# Patient Record
Sex: FEMALE | Race: OTHER | HISPANIC OR LATINO
[De-identification: names, ages, dates, MRNs, and addresses within clinical notes are randomized per-mention and may not be internally consistent; named-entity substitution may affect disease eponyms.]

---

## 2017-03-29 ENCOUNTER — APPOINTMENT (OUTPATIENT)
Dept: COLORECTAL SURGERY | Facility: CLINIC | Age: 42
End: 2017-03-29

## 2017-03-29 VITALS
HEART RATE: 71 BPM | WEIGHT: 142 LBS | BODY MASS INDEX: 28.63 KG/M2 | TEMPERATURE: 98.1 F | DIASTOLIC BLOOD PRESSURE: 67 MMHG | SYSTOLIC BLOOD PRESSURE: 102 MMHG | RESPIRATION RATE: 12 BRPM | HEIGHT: 59 IN

## 2017-05-06 ENCOUNTER — EMERGENCY (EMERGENCY)
Facility: HOSPITAL | Age: 42
LOS: 1 days | Discharge: HOME | End: 2017-05-06
Attending: EMERGENCY MEDICINE
Payer: COMMERCIAL

## 2017-05-06 VITALS
HEIGHT: 60 IN | DIASTOLIC BLOOD PRESSURE: 59 MMHG | TEMPERATURE: 98 F | SYSTOLIC BLOOD PRESSURE: 92 MMHG | OXYGEN SATURATION: 99 % | RESPIRATION RATE: 17 BRPM | WEIGHT: 147.93 LBS | HEART RATE: 86 BPM

## 2017-05-06 DIAGNOSIS — R10.9 UNSPECIFIED ABDOMINAL PAIN: ICD-10-CM

## 2017-05-06 DIAGNOSIS — Z98.51 TUBAL LIGATION STATUS: ICD-10-CM

## 2017-05-06 DIAGNOSIS — R10.30 LOWER ABDOMINAL PAIN, UNSPECIFIED: ICD-10-CM

## 2017-05-06 DIAGNOSIS — Z98.51 TUBAL LIGATION STATUS: Chronic | ICD-10-CM

## 2017-05-06 DIAGNOSIS — Z71.1 PERSON WITH FEARED HEALTH COMPLAINT IN WHOM NO DIAGNOSIS IS MADE: ICD-10-CM

## 2017-05-06 PROCEDURE — T1013: CPT

## 2017-05-06 PROCEDURE — 99283 EMERGENCY DEPT VISIT LOW MDM: CPT

## 2017-05-06 PROCEDURE — 99282 EMERGENCY DEPT VISIT SF MDM: CPT

## 2017-05-06 NOTE — ED STATDOCS - OBJECTIVE STATEMENT
This is a 41 year old female with hx of tubal ligation presenting to the ED c/o abdominal pain the began 2 months ago. Pt was called by PMD and advised to seek ED evaluation for abdominal US (?). She states she has surgery scheduled on 5/20 for hernia repair, but pt states she is unsure if she even has one. Pt has come to ED to confirm if she has hernia or not. Denies fever, chills, nausea/vomiting, CP, SOB, HA. No further complaints at this time.

## 2017-05-06 NOTE — ED ADULT TRIAGE NOTE - CHIEF COMPLAINT QUOTE
Pt c/o abdominal pain in old surgical site x3 years ago.  Pt denies fever, nausea or vomiting.  Pt states when she eats spicy foods it bothers her and was told by pmd she has a hernia.

## 2017-05-06 NOTE — ED STATDOCS - MEDICAL DECISION MAKING DETAILS
Pt concerned that she may have a hernia and came for a check up, no hernia palpated, pt instructed to f/u with PMD to get outpt studies as needed.

## 2017-05-12 ENCOUNTER — OUTPATIENT (OUTPATIENT)
Dept: OUTPATIENT SERVICES | Facility: HOSPITAL | Age: 42
LOS: 1 days | End: 2017-05-12
Payer: COMMERCIAL

## 2017-05-12 VITALS
SYSTOLIC BLOOD PRESSURE: 93 MMHG | RESPIRATION RATE: 16 BRPM | DIASTOLIC BLOOD PRESSURE: 63 MMHG | HEART RATE: 77 BPM | HEIGHT: 57 IN | TEMPERATURE: 98 F | WEIGHT: 157.19 LBS

## 2017-05-12 DIAGNOSIS — Z01.818 ENCOUNTER FOR OTHER PREPROCEDURAL EXAMINATION: ICD-10-CM

## 2017-05-12 DIAGNOSIS — K43.0 INCISIONAL HERNIA WITH OBSTRUCTION, WITHOUT GANGRENE: ICD-10-CM

## 2017-05-12 DIAGNOSIS — Z98.51 TUBAL LIGATION STATUS: Chronic | ICD-10-CM

## 2017-05-12 LAB
ANION GAP SERPL CALC-SCNC: 13 MMOL/L — SIGNIFICANT CHANGE UP (ref 5–17)
APTT BLD: 32.8 SEC — SIGNIFICANT CHANGE UP (ref 27.5–37.4)
BLD GP AB SCN SERPL QL: SIGNIFICANT CHANGE UP
BUN SERPL-MCNC: 19 MG/DL — SIGNIFICANT CHANGE UP (ref 8–20)
CALCIUM SERPL-MCNC: 9.6 MG/DL — SIGNIFICANT CHANGE UP (ref 8.6–10.2)
CHLORIDE SERPL-SCNC: 101 MMOL/L — SIGNIFICANT CHANGE UP (ref 98–107)
CO2 SERPL-SCNC: 25 MMOL/L — SIGNIFICANT CHANGE UP (ref 22–29)
CREAT SERPL-MCNC: 0.6 MG/DL — SIGNIFICANT CHANGE UP (ref 0.5–1.3)
GLUCOSE SERPL-MCNC: 103 MG/DL — SIGNIFICANT CHANGE UP (ref 70–115)
HCT VFR BLD CALC: 35.7 % — LOW (ref 37–47)
HGB BLD-MCNC: 11.9 G/DL — LOW (ref 12–16)
INR BLD: 1.07 RATIO — SIGNIFICANT CHANGE UP (ref 0.88–1.16)
MCHC RBC-ENTMCNC: 30.9 PG — SIGNIFICANT CHANGE UP (ref 27–31)
MCHC RBC-ENTMCNC: 33.3 G/DL — SIGNIFICANT CHANGE UP (ref 32–36)
MCV RBC AUTO: 92.7 FL — SIGNIFICANT CHANGE UP (ref 81–99)
PLATELET # BLD AUTO: 249 K/UL — SIGNIFICANT CHANGE UP (ref 150–400)
POTASSIUM SERPL-MCNC: 3.6 MMOL/L — SIGNIFICANT CHANGE UP (ref 3.5–5.3)
POTASSIUM SERPL-SCNC: 3.6 MMOL/L — SIGNIFICANT CHANGE UP (ref 3.5–5.3)
PROTHROM AB SERPL-ACNC: 11.8 SEC — SIGNIFICANT CHANGE UP (ref 9.8–12.7)
RBC # BLD: 3.85 M/UL — LOW (ref 4.4–5.2)
RBC # FLD: 13 % — SIGNIFICANT CHANGE UP (ref 11–15.6)
SODIUM SERPL-SCNC: 139 MMOL/L — SIGNIFICANT CHANGE UP (ref 135–145)
TYPE + AB SCN PNL BLD: SIGNIFICANT CHANGE UP
WBC # BLD: 5.9 K/UL — SIGNIFICANT CHANGE UP (ref 4.8–10.8)
WBC # FLD AUTO: 5.9 K/UL — SIGNIFICANT CHANGE UP (ref 4.8–10.8)

## 2017-05-12 PROCEDURE — 85027 COMPLETE CBC AUTOMATED: CPT

## 2017-05-12 PROCEDURE — 86850 RBC ANTIBODY SCREEN: CPT

## 2017-05-12 PROCEDURE — 85610 PROTHROMBIN TIME: CPT

## 2017-05-12 PROCEDURE — 80048 BASIC METABOLIC PNL TOTAL CA: CPT

## 2017-05-12 PROCEDURE — 86900 BLOOD TYPING SEROLOGIC ABO: CPT

## 2017-05-12 PROCEDURE — 85730 THROMBOPLASTIN TIME PARTIAL: CPT

## 2017-05-12 PROCEDURE — 86901 BLOOD TYPING SEROLOGIC RH(D): CPT

## 2017-05-12 PROCEDURE — G0463: CPT

## 2017-05-12 RX ORDER — BUPIVACAINE 13.3 MG/ML
20 INJECTION, SUSPENSION, LIPOSOMAL INFILTRATION ONCE
Qty: 0 | Refills: 0 | Status: DISCONTINUED | OUTPATIENT
Start: 2017-05-25 | End: 2017-06-09

## 2017-05-12 RX ORDER — CEFAZOLIN SODIUM 1 G
2000 VIAL (EA) INJECTION ONCE
Qty: 0 | Refills: 0 | Status: DISCONTINUED | OUTPATIENT
Start: 2017-05-25 | End: 2017-06-09

## 2017-05-12 RX ORDER — ALVIMOPAN 12 MG/1
12 CAPSULE ORAL ONCE
Qty: 0 | Refills: 0 | Status: COMPLETED | OUTPATIENT
Start: 2017-05-25 | End: 2017-05-25

## 2017-05-12 NOTE — H&P PST ADULT - ASSESSMENT
41 year old female with past medical history of tubal ligation present to [facility] for open repair of ventral incisional incarcerated hernia with mesh and flaps.

## 2017-05-12 NOTE — H&P PST ADULT - HISTORY OF PRESENT ILLNESS
41 year old female with past medical history of tubal ligation present to [facility] for open repair of ventral incisional incarcerated hernia with mesh and flaps.       via vira line #[id number]

## 2017-05-12 NOTE — H&P PST ADULT - NSANTHOSAYNRD_GEN_A_CORE
No. MIMI screening performed.  STOP BANG Legend: 0-2 = LOW Risk; 3-4 = INTERMEDIATE Risk; 5-8 = HIGH Risk

## 2017-05-25 ENCOUNTER — OUTPATIENT (OUTPATIENT)
Dept: OUTPATIENT SERVICES | Facility: HOSPITAL | Age: 42
LOS: 1 days | End: 2017-05-25
Payer: COMMERCIAL

## 2017-05-25 ENCOUNTER — RESULT REVIEW (OUTPATIENT)
Age: 42
End: 2017-05-25

## 2017-05-25 ENCOUNTER — APPOINTMENT (OUTPATIENT)
Dept: COLORECTAL SURGERY | Facility: HOSPITAL | Age: 42
End: 2017-05-25

## 2017-05-25 ENCOUNTER — TRANSCRIPTION ENCOUNTER (OUTPATIENT)
Age: 42
End: 2017-05-25

## 2017-05-25 VITALS
HEART RATE: 77 BPM | SYSTOLIC BLOOD PRESSURE: 106 MMHG | OXYGEN SATURATION: 100 % | TEMPERATURE: 98 F | RESPIRATION RATE: 16 BRPM | DIASTOLIC BLOOD PRESSURE: 70 MMHG

## 2017-05-25 VITALS
HEART RATE: 62 BPM | SYSTOLIC BLOOD PRESSURE: 96 MMHG | RESPIRATION RATE: 16 BRPM | DIASTOLIC BLOOD PRESSURE: 49 MMHG | OXYGEN SATURATION: 100 % | WEIGHT: 157.19 LBS | HEIGHT: 57 IN | TEMPERATURE: 97 F

## 2017-05-25 DIAGNOSIS — Z98.51 TUBAL LIGATION STATUS: Chronic | ICD-10-CM

## 2017-05-25 DIAGNOSIS — Z01.818 ENCOUNTER FOR OTHER PREPROCEDURAL EXAMINATION: ICD-10-CM

## 2017-05-25 DIAGNOSIS — K43.0 INCISIONAL HERNIA WITH OBSTRUCTION, WITHOUT GANGRENE: ICD-10-CM

## 2017-05-25 PROCEDURE — 88302 TISSUE EXAM BY PATHOLOGIST: CPT

## 2017-05-25 PROCEDURE — 49655: CPT | Mod: AS

## 2017-05-25 PROCEDURE — C1781: CPT

## 2017-05-25 PROCEDURE — 88302 TISSUE EXAM BY PATHOLOGIST: CPT | Mod: 26

## 2017-05-25 PROCEDURE — T1013: CPT

## 2017-05-25 PROCEDURE — 49653: CPT

## 2017-05-25 PROCEDURE — 36415 COLL VENOUS BLD VENIPUNCTURE: CPT

## 2017-05-25 PROCEDURE — 49655: CPT

## 2017-05-25 RX ORDER — KETOROLAC TROMETHAMINE 30 MG/ML
1 SYRINGE (ML) INJECTION
Qty: 20 | Refills: 0 | OUTPATIENT
Start: 2017-05-25 | End: 2017-05-30

## 2017-05-25 RX ORDER — OXYCODONE HYDROCHLORIDE 5 MG/1
1 TABLET ORAL
Qty: 20 | Refills: 0 | OUTPATIENT
Start: 2017-05-25

## 2017-05-25 RX ORDER — FENTANYL CITRATE 50 UG/ML
50 INJECTION INTRAVENOUS
Qty: 0 | Refills: 0 | Status: DISCONTINUED | OUTPATIENT
Start: 2017-05-25 | End: 2017-05-25

## 2017-05-25 RX ADMIN — ALVIMOPAN 12 MILLIGRAM(S): 12 CAPSULE ORAL at 10:08

## 2017-05-25 NOTE — ASU DISCHARGE PLAN (ADULT/PEDIATRIC). - NOTIFY
Excessive Diarrhea/Fever greater than 101/Persistent Nausea and Vomiting/Pain not relieved by Medications

## 2017-05-25 NOTE — ASU DISCHARGE PLAN (ADULT/PEDIATRIC). - SPECIAL INSTRUCTIONS
remove all gauze on 5/26 and shower. No need to cover incision after 5/26/17. Wear abdominal binder during day and as needed. May remove at night to sleep.

## 2017-05-25 NOTE — ASU DISCHARGE PLAN (ADULT/PEDIATRIC). - MEDICATION SUMMARY - MEDICATIONS TO TAKE
I will START or STAY ON the medications listed below when I get home from the hospital:    acetaminophen-oxycodone 325 mg-5 mg oral tablet  -- 1 tab(s) by mouth every 6 hours, As Needed  -for moderate pain [id number]:004  -- Caution federal law prohibits the transfer of this drug to any person other  than the person for whom it was prescribed.  May cause drowsiness.  Alcohol may intensify this effect.  Use care when operating dangerous machinery.  This prescription cannot be refilled.  This product contains acetaminophen.  Do not use  with any other product containing acetaminophen to prevent possible liver damage.  Using more of this medication than prescribed may cause serious breathing problems.    -- Indication: For pain    ketorolac 10 mg oral tablet  -- 1 tab(s) by mouth 4 times a day, As Needed -for moderate pain  -- It is very important that you take or use this exactly as directed.  Do not skip doses or discontinue unless directed by your doctor.  May cause drowsiness or dizziness.  Obtain medical advice before taking any non-prescription drugs as some may affect the action of this medication.  Take with food or milk.    -- Indication: For pain

## 2017-05-31 ENCOUNTER — EMERGENCY (EMERGENCY)
Facility: HOSPITAL | Age: 42
LOS: 1 days | Discharge: HOME | End: 2017-05-31
Attending: EMERGENCY MEDICINE
Payer: COMMERCIAL

## 2017-05-31 VITALS
RESPIRATION RATE: 20 BRPM | TEMPERATURE: 98 F | SYSTOLIC BLOOD PRESSURE: 96 MMHG | OXYGEN SATURATION: 98 % | HEART RATE: 72 BPM | HEIGHT: 59 IN | DIASTOLIC BLOOD PRESSURE: 64 MMHG

## 2017-05-31 DIAGNOSIS — Z98.51 TUBAL LIGATION STATUS: Chronic | ICD-10-CM

## 2017-05-31 LAB — SURGICAL PATHOLOGY FINAL REPORT - CH: SIGNIFICANT CHANGE UP

## 2017-05-31 PROCEDURE — 99284 EMERGENCY DEPT VISIT MOD MDM: CPT | Mod: 25

## 2017-06-01 ENCOUNTER — APPOINTMENT (OUTPATIENT)
Dept: COLORECTAL SURGERY | Facility: CLINIC | Age: 42
End: 2017-06-01

## 2017-06-01 VITALS
RESPIRATION RATE: 20 BRPM | OXYGEN SATURATION: 99 % | TEMPERATURE: 98 F | HEART RATE: 80 BPM | SYSTOLIC BLOOD PRESSURE: 118 MMHG | DIASTOLIC BLOOD PRESSURE: 77 MMHG

## 2017-06-01 VITALS
DIASTOLIC BLOOD PRESSURE: 65 MMHG | HEIGHT: 59 IN | WEIGHT: 142 LBS | SYSTOLIC BLOOD PRESSURE: 97 MMHG | TEMPERATURE: 98.2 F | RESPIRATION RATE: 14 BRPM | HEART RATE: 73 BPM | BODY MASS INDEX: 28.63 KG/M2

## 2017-06-01 DIAGNOSIS — Z98.890 OTHER SPECIFIED POSTPROCEDURAL STATES: Chronic | ICD-10-CM

## 2017-06-01 DIAGNOSIS — K43.0 INCISIONAL HERNIA WITH OBSTRUCTION, W/OUT GANGRENE: ICD-10-CM

## 2017-06-01 PROCEDURE — T1013: CPT

## 2017-06-01 PROCEDURE — 74022 RADEX COMPL AQT ABD SERIES: CPT

## 2017-06-01 PROCEDURE — 74022 RADEX COMPL AQT ABD SERIES: CPT | Mod: 26

## 2017-06-01 PROCEDURE — 99284 EMERGENCY DEPT VISIT MOD MDM: CPT | Mod: 25

## 2017-06-01 NOTE — ED PROVIDER NOTE - OBJECTIVE STATEMENT
A 41 year old female pt presents to the ED c/o abdominal pain. The pt had hernia repair one week ago and was prescribed Percocet for the pain and states that the medication was working until she ran out. She reports developing burning abdominal pain at her incision sites since she stopped taking the medication. She denies any N/V/D, dysuria, or any issues having BM's. Pt is to follow up with GI next week. No further complaints at this time. A 41 year old female pt presents to the ED c/o abdominal pain. The pt had hernia repair one week ago by Dr. Gregory and was prescribed Percocet for the pain and states that the medication was working until she ran out. She reports developing burning abdominal pain at her incision sites since she stopped taking the medication. She denies any N/V/D, dysuria, or any issues having BM's. Pt is to follow up with GI next week. No further complaints at this time.

## 2017-06-01 NOTE — ED PROVIDER NOTE - GASTROINTESTINAL, MLM
Abdomen soft, slightly distended. Localized tenderness around the abdominal stab wounds from laparoscopic surgery with tenderness worse at the umbilicus. No discharge, no induration.

## 2017-06-01 NOTE — ED PROVIDER NOTE - MEDICAL DECISION MAKING DETAILS
A 41 year old female pt presents to the ED c/o abdominal pain. Will give Percocet for pain and re-evaluate.

## 2017-06-01 NOTE — ED PROVIDER NOTE - NS ED MD SCRIBE ATTENDING SCRIBE SECTIONS
PAST MEDICAL/SURGICAL/SOCIAL HISTORY/VITAL SIGNS( Pullset)/HIV/REVIEW OF SYSTEMS/PHYSICAL EXAM/HISTORY OF PRESENT ILLNESS/DISPOSITION

## 2017-06-12 ENCOUNTER — APPOINTMENT (OUTPATIENT)
Dept: COLORECTAL SURGERY | Facility: CLINIC | Age: 42
End: 2017-06-12

## 2017-06-18 ENCOUNTER — EMERGENCY (EMERGENCY)
Facility: HOSPITAL | Age: 42
LOS: 1 days | Discharge: HOME | End: 2017-06-18
Attending: EMERGENCY MEDICINE | Admitting: EMERGENCY MEDICINE
Payer: COMMERCIAL

## 2017-06-18 VITALS
DIASTOLIC BLOOD PRESSURE: 70 MMHG | HEART RATE: 61 BPM | OXYGEN SATURATION: 99 % | WEIGHT: 158.07 LBS | SYSTOLIC BLOOD PRESSURE: 103 MMHG | TEMPERATURE: 98 F | RESPIRATION RATE: 18 BRPM

## 2017-06-18 DIAGNOSIS — Z98.51 TUBAL LIGATION STATUS: Chronic | ICD-10-CM

## 2017-06-18 DIAGNOSIS — Z98.890 OTHER SPECIFIED POSTPROCEDURAL STATES: Chronic | ICD-10-CM

## 2017-06-18 LAB
APPEARANCE UR: ABNORMAL
BACTERIA # UR AUTO: ABNORMAL
BILIRUB UR-MCNC: NEGATIVE — SIGNIFICANT CHANGE UP
COLOR SPEC: YELLOW — SIGNIFICANT CHANGE UP
DIFF PNL FLD: ABNORMAL
EPI CELLS # UR: SIGNIFICANT CHANGE UP
GLUCOSE UR QL: NEGATIVE MG/DL — SIGNIFICANT CHANGE UP
HCG UR QL: NEGATIVE — SIGNIFICANT CHANGE UP
KETONES UR-MCNC: NEGATIVE — SIGNIFICANT CHANGE UP
LEUKOCYTE ESTERASE UR-ACNC: ABNORMAL
NITRITE UR-MCNC: NEGATIVE — SIGNIFICANT CHANGE UP
PH UR: 7 — SIGNIFICANT CHANGE UP (ref 5–8)
PROT UR-MCNC: NEGATIVE MG/DL — SIGNIFICANT CHANGE UP
RBC CASTS # UR COMP ASSIST: ABNORMAL /HPF (ref 0–4)
SP GR SPEC: 1 — LOW (ref 1.01–1.02)
UROBILINOGEN FLD QL: NEGATIVE MG/DL — SIGNIFICANT CHANGE UP
WBC UR QL: SIGNIFICANT CHANGE UP

## 2017-06-18 PROCEDURE — 99283 EMERGENCY DEPT VISIT LOW MDM: CPT | Mod: 25

## 2017-06-18 PROCEDURE — 81001 URINALYSIS AUTO W/SCOPE: CPT

## 2017-06-18 PROCEDURE — 81025 URINE PREGNANCY TEST: CPT

## 2017-06-18 PROCEDURE — 99283 EMERGENCY DEPT VISIT LOW MDM: CPT

## 2017-06-18 RX ORDER — PHENAZOPYRIDINE HCL 100 MG
200 TABLET ORAL ONCE
Qty: 0 | Refills: 0 | Status: COMPLETED | OUTPATIENT
Start: 2017-06-18 | End: 2017-06-18

## 2017-06-18 RX ORDER — PHENAZOPYRIDINE HCL 100 MG
1 TABLET ORAL
Qty: 6 | Refills: 0 | OUTPATIENT
Start: 2017-06-18 | End: 2017-06-20

## 2017-06-18 RX ORDER — CEPHALEXIN 500 MG
500 CAPSULE ORAL ONCE
Qty: 0 | Refills: 0 | Status: COMPLETED | OUTPATIENT
Start: 2017-06-18 | End: 2017-06-18

## 2017-06-18 RX ORDER — CEPHALEXIN 500 MG
1 CAPSULE ORAL
Qty: 20 | Refills: 0 | OUTPATIENT
Start: 2017-06-18 | End: 2017-06-23

## 2017-06-18 RX ORDER — CEPHALEXIN 500 MG
1 CAPSULE ORAL
Qty: 28 | Refills: 0 | OUTPATIENT
Start: 2017-06-18 | End: 2017-06-25

## 2017-06-18 RX ADMIN — Medication 500 MILLIGRAM(S): at 05:49

## 2017-06-18 RX ADMIN — Medication 200 MILLIGRAM(S): at 05:49

## 2017-06-20 NOTE — ED PROVIDER NOTE - CARE PLAN
Principal Discharge DX:	Acute cystitis with hematuria  Instructions for follow-up, activity and diet:	Continue with Medication as discussed

## 2017-06-20 NOTE — ED PROVIDER NOTE - ATTENDING CONTRIBUTION TO CARE
I personally saw the patient with the PA, and completed the key components of the history and physical exam. I then discussed the management plan with the PA.    40 yr old F with 1 day hx of dysuria, frequency and incomplete voiding. + Suprapubic pain and tenderness. Pt treated with Keflex and Pyridium , F/U with medical clinic as discussed.    Disposition:

## 2017-06-20 NOTE — ED PROVIDER NOTE - MEDICAL DECISION MAKING DETAILS
40 yr old F with 1 day hx of dysuria, frequency and incomplete voiding. + Suprapubic pain and tenderness. Pt treated with Keflex and Pyridium , F/U with medical clinic as discussed.

## 2017-06-20 NOTE — ED PROVIDER NOTE - PROGRESS NOTE DETAILS
Pt denies fever chills and is not toxic in appearance, No CVA tenderness on palpation . Pt treated with Keflex and Pyridium in ED, F/U with Medical clinic as discussed

## 2017-06-20 NOTE — ED PROVIDER NOTE - OBJECTIVE STATEMENT
41 yr old F presented to ED for dysuria,  urinary frequency, urgency and incomplete voiding . Patient stated that she started feeling that she have been experiencing pain in her lower abdominal region. Pt denies fever, chills , nausea or vomiting. Pt also denies lower back pain or flank pain.

## 2017-07-10 ENCOUNTER — APPOINTMENT (OUTPATIENT)
Dept: CT IMAGING | Facility: CLINIC | Age: 42
End: 2017-07-10

## 2017-07-10 ENCOUNTER — OUTPATIENT (OUTPATIENT)
Dept: OUTPATIENT SERVICES | Facility: HOSPITAL | Age: 42
LOS: 1 days | End: 2017-07-10
Payer: MEDICAID

## 2017-07-10 ENCOUNTER — APPOINTMENT (OUTPATIENT)
Dept: COLORECTAL SURGERY | Facility: CLINIC | Age: 42
End: 2017-07-10

## 2017-07-10 VITALS
BODY MASS INDEX: 28.63 KG/M2 | HEIGHT: 59 IN | WEIGHT: 142 LBS | HEART RATE: 70 BPM | RESPIRATION RATE: 14 BRPM | SYSTOLIC BLOOD PRESSURE: 100 MMHG | TEMPERATURE: 98 F | DIASTOLIC BLOOD PRESSURE: 65 MMHG

## 2017-07-10 DIAGNOSIS — R10.9 UNSPECIFIED ABDOMINAL PAIN: ICD-10-CM

## 2017-07-10 DIAGNOSIS — Z98.51 TUBAL LIGATION STATUS: Chronic | ICD-10-CM

## 2017-07-10 DIAGNOSIS — Z98.890 OTHER SPECIFIED POSTPROCEDURAL STATES: Chronic | ICD-10-CM

## 2017-07-10 DIAGNOSIS — Z00.8 ENCOUNTER FOR OTHER GENERAL EXAMINATION: ICD-10-CM

## 2017-07-10 PROCEDURE — 74177 CT ABD & PELVIS W/CONTRAST: CPT

## 2017-07-12 ENCOUNTER — MESSAGE (OUTPATIENT)
Age: 42
End: 2017-07-12

## 2017-12-20 ENCOUNTER — EMERGENCY (EMERGENCY)
Facility: HOSPITAL | Age: 42
LOS: 1 days | Discharge: HOME | End: 2017-12-20
Attending: EMERGENCY MEDICINE | Admitting: EMERGENCY MEDICINE
Payer: COMMERCIAL

## 2017-12-20 VITALS
WEIGHT: 156.09 LBS | RESPIRATION RATE: 18 BRPM | HEART RATE: 81 BPM | DIASTOLIC BLOOD PRESSURE: 65 MMHG | TEMPERATURE: 98 F | SYSTOLIC BLOOD PRESSURE: 99 MMHG | OXYGEN SATURATION: 98 %

## 2017-12-20 DIAGNOSIS — Z98.890 OTHER SPECIFIED POSTPROCEDURAL STATES: Chronic | ICD-10-CM

## 2017-12-20 DIAGNOSIS — Z98.51 TUBAL LIGATION STATUS: Chronic | ICD-10-CM

## 2017-12-20 LAB
APPEARANCE UR: CLEAR — SIGNIFICANT CHANGE UP
BACTERIA # UR AUTO: ABNORMAL
BILIRUB UR-MCNC: ABNORMAL
COLOR SPEC: ABNORMAL
DIFF PNL FLD: ABNORMAL
EPI CELLS # UR: SIGNIFICANT CHANGE UP
GLUCOSE UR QL: NEGATIVE MG/DL — SIGNIFICANT CHANGE UP
HCG UR QL: NEGATIVE — SIGNIFICANT CHANGE UP
KETONES UR-MCNC: NEGATIVE — SIGNIFICANT CHANGE UP
LEUKOCYTE ESTERASE UR-ACNC: ABNORMAL
NITRITE UR-MCNC: POSITIVE
PH UR: 8 — SIGNIFICANT CHANGE UP (ref 5–8)
PROT UR-MCNC: 15 MG/DL
RBC CASTS # UR COMP ASSIST: SIGNIFICANT CHANGE UP /HPF (ref 0–4)
SP GR SPEC: 1.01 — SIGNIFICANT CHANGE UP (ref 1.01–1.02)
UROBILINOGEN FLD QL: 4 MG/DL
WBC UR QL: SIGNIFICANT CHANGE UP

## 2017-12-20 PROCEDURE — 87086 URINE CULTURE/COLONY COUNT: CPT

## 2017-12-20 PROCEDURE — T1013: CPT

## 2017-12-20 PROCEDURE — 81001 URINALYSIS AUTO W/SCOPE: CPT

## 2017-12-20 PROCEDURE — 81025 URINE PREGNANCY TEST: CPT

## 2017-12-20 PROCEDURE — 99283 EMERGENCY DEPT VISIT LOW MDM: CPT

## 2017-12-20 RX ORDER — PHENAZOPYRIDINE HCL 100 MG
100 TABLET ORAL ONCE
Qty: 0 | Refills: 0 | Status: COMPLETED | OUTPATIENT
Start: 2017-12-20 | End: 2017-12-20

## 2017-12-20 RX ORDER — NITROFURANTOIN MACROCRYSTAL 50 MG
1 CAPSULE ORAL
Qty: 14 | Refills: 0 | OUTPATIENT
Start: 2017-12-20 | End: 2017-12-26

## 2017-12-20 RX ORDER — PHENAZOPYRIDINE HCL 100 MG
1 TABLET ORAL
Qty: 6 | Refills: 0 | OUTPATIENT
Start: 2017-12-20 | End: 2017-12-21

## 2017-12-20 RX ORDER — NITROFURANTOIN MACROCRYSTAL 50 MG
100 CAPSULE ORAL ONCE
Qty: 0 | Refills: 0 | Status: COMPLETED | OUTPATIENT
Start: 2017-12-20 | End: 2017-12-20

## 2017-12-20 RX ADMIN — Medication 100 MILLIGRAM(S): at 17:10

## 2017-12-20 NOTE — ED PROVIDER NOTE - MEDICAL DECISION MAKING DETAILS
42 year old female presenting to the ED complaining of dysuria and vaginal pain x 3 days. Will check UA and treat accordingly.

## 2017-12-20 NOTE — ED PROVIDER NOTE - OBJECTIVE STATEMENT
42 year old female presenting to the ED complaining of vaginal pain and difficulty urinating x 3 days. Pt also describes having dysuria and incomplete voiding, but denies having any fever, chills, nausea, frequency, hematuria, vaginal itching, vaginal discharge, or lower back pain. She states that she has had similar sx in the past for which she has visited the ED, and was given medication and pyridium. Pt denies having any PMHx, and states that she has PSHx of umbilical hernia surgery, 2 inguinal hernia surgeries, 2 abdominal hernia surgeries, and tubal ligation. No further complaints at this time.  : Juju

## 2017-12-20 NOTE — ED PROVIDER NOTE - CHPI ED SYMPTOMS NEG
no nausea/NO URINARY FREQUENCY, NO VAGINAL ITCHING, NO VAGINAL DISCHARGE, NO LOWER BACK PAIN/no hematuria/no chills/no fever

## 2017-12-20 NOTE — ED PROVIDER NOTE - PROGRESS NOTE DETAILS
Pt Labs reviewed and discussed with patient. Pt treated with Macrobid and D/C with Rx Macrobid and pyridium. F/u with PCP as discussed.

## 2017-12-20 NOTE — ED PROVIDER NOTE - ATTENDING CONTRIBUTION TO CARE
I, Elton Weston, performed the initial face to face bedside interview with this patient regarding history of present illness, review of symptoms and relevant past medical, social and family history.  I completed an independent physical examination.  I was the initial provider who evaluated this patient. I have signed out the follow up of any pending tests (i.e. labs, radiological studies) to the ACP.  I have communicated the patient’s plan of care and disposition with the ACP.

## 2017-12-22 LAB
CULTURE RESULTS: SIGNIFICANT CHANGE UP
SPECIMEN SOURCE: SIGNIFICANT CHANGE UP

## 2017-12-27 LAB — CULTURE RESULTS: SIGNIFICANT CHANGE UP

## 2018-03-11 ENCOUNTER — EMERGENCY (EMERGENCY)
Facility: HOSPITAL | Age: 43
LOS: 1 days | Discharge: HOME | End: 2018-03-11
Attending: EMERGENCY MEDICINE
Payer: COMMERCIAL

## 2018-03-11 VITALS
RESPIRATION RATE: 20 BRPM | HEART RATE: 95 BPM | TEMPERATURE: 98 F | SYSTOLIC BLOOD PRESSURE: 115 MMHG | OXYGEN SATURATION: 96 % | DIASTOLIC BLOOD PRESSURE: 73 MMHG

## 2018-03-11 VITALS — WEIGHT: 149.91 LBS

## 2018-03-11 DIAGNOSIS — Z98.890 OTHER SPECIFIED POSTPROCEDURAL STATES: Chronic | ICD-10-CM

## 2018-03-11 DIAGNOSIS — Z98.51 TUBAL LIGATION STATUS: Chronic | ICD-10-CM

## 2018-03-11 LAB
APPEARANCE UR: ABNORMAL
BACTERIA # UR AUTO: ABNORMAL
BILIRUB UR-MCNC: ABNORMAL
COLOR SPEC: ABNORMAL
DIFF PNL FLD: ABNORMAL
EPI CELLS # UR: SIGNIFICANT CHANGE UP
GLUCOSE UR QL: NEGATIVE MG/DL — SIGNIFICANT CHANGE UP
KETONES UR-MCNC: NEGATIVE — SIGNIFICANT CHANGE UP
LEUKOCYTE ESTERASE UR-ACNC: ABNORMAL
NITRITE UR-MCNC: POSITIVE
PH UR: 6.5 — SIGNIFICANT CHANGE UP (ref 5–8)
PROT UR-MCNC: 30 MG/DL
RBC CASTS # UR COMP ASSIST: >50 /HPF (ref 0–4)
SP GR SPEC: 1.02 — SIGNIFICANT CHANGE UP (ref 1.01–1.02)
UROBILINOGEN FLD QL: 12 MG/DL
WBC UR QL: >50

## 2018-03-11 PROCEDURE — 81001 URINALYSIS AUTO W/SCOPE: CPT

## 2018-03-11 PROCEDURE — 99283 EMERGENCY DEPT VISIT LOW MDM: CPT

## 2018-03-11 PROCEDURE — 87086 URINE CULTURE/COLONY COUNT: CPT

## 2018-03-11 PROCEDURE — T1013: CPT

## 2018-03-11 RX ORDER — CEPHALEXIN 500 MG
500 CAPSULE ORAL ONCE
Qty: 0 | Refills: 0 | Status: COMPLETED | OUTPATIENT
Start: 2018-03-11 | End: 2018-03-11

## 2018-03-11 RX ORDER — OXYCODONE AND ACETAMINOPHEN 5; 325 MG/1; MG/1
1 TABLET ORAL ONCE
Qty: 0 | Refills: 0 | Status: DISCONTINUED | OUTPATIENT
Start: 2018-03-11 | End: 2018-03-11

## 2018-03-11 RX ORDER — CEPHALEXIN 500 MG
1 CAPSULE ORAL
Qty: 20 | Refills: 0 | OUTPATIENT
Start: 2018-03-11 | End: 2018-03-20

## 2018-03-11 RX ORDER — PHENAZOPYRIDINE HCL 100 MG
1 TABLET ORAL
Qty: 9 | Refills: 0 | OUTPATIENT
Start: 2018-03-11 | End: 2018-03-13

## 2018-03-11 RX ADMIN — Medication 500 MILLIGRAM(S): at 17:35

## 2018-03-11 RX ADMIN — OXYCODONE AND ACETAMINOPHEN 1 TABLET(S): 5; 325 TABLET ORAL at 17:36

## 2018-03-11 NOTE — ED STATDOCS - OBJECTIVE STATEMENT
43 y/o F pt with hx tubal ligation, hernia repair, UTI presents to ED c/o dysuria/burning urination. She has no noted discharge, fever, chills, N/V  LMP: February 28th

## 2018-03-11 NOTE — ED ADULT NURSE NOTE - OBJECTIVE STATEMENT
Pt c/o painful and burning urination x a few days. pt states she took some OTC medication from the [facility] but is not having an relief. Pt is A & Ox4 respirations are even & unlabored.  Pt waiting for MD evaluation.

## 2018-03-12 LAB
CULTURE RESULTS: NO GROWTH — SIGNIFICANT CHANGE UP
SPECIMEN SOURCE: SIGNIFICANT CHANGE UP

## 2018-03-23 NOTE — ED STATDOCS - CROS ED ROS STATEMENT
all other ROS negative except as per HPI no loss of consciousness, no gait abnormality, no headache, no sensory deficits, and no weakness.

## 2018-09-22 ENCOUNTER — EMERGENCY (EMERGENCY)
Facility: HOSPITAL | Age: 43
LOS: 1 days | Discharge: HOME | End: 2018-09-22
Attending: EMERGENCY MEDICINE
Payer: COMMERCIAL

## 2018-09-22 VITALS
SYSTOLIC BLOOD PRESSURE: 100 MMHG | HEART RATE: 80 BPM | TEMPERATURE: 98 F | DIASTOLIC BLOOD PRESSURE: 66 MMHG | RESPIRATION RATE: 20 BRPM

## 2018-09-22 DIAGNOSIS — Z98.890 OTHER SPECIFIED POSTPROCEDURAL STATES: Chronic | ICD-10-CM

## 2018-09-22 DIAGNOSIS — Z98.51 TUBAL LIGATION STATUS: Chronic | ICD-10-CM

## 2018-09-22 PROCEDURE — 99283 EMERGENCY DEPT VISIT LOW MDM: CPT

## 2018-09-22 RX ORDER — AMOXICILLIN 250 MG/5ML
1 SUSPENSION, RECONSTITUTED, ORAL (ML) ORAL
Qty: 30 | Refills: 0 | OUTPATIENT
Start: 2018-09-22 | End: 2018-10-01

## 2018-09-22 RX ORDER — ACETAMINOPHEN 500 MG
2 TABLET ORAL
Qty: 30 | Refills: 0 | OUTPATIENT
Start: 2018-09-22 | End: 2018-09-26

## 2018-09-22 NOTE — ED STATDOCS - OBJECTIVE STATEMENT
43 y/o F pt with hx of tubal ligation and hernia repair presents to ED c/o L ear pain and decreased hearing that began 4 days ago. Pt's child kicked her in the ear when he was playing. She has been eating and drinking. She does not have a PMD. NKDA Denies fever, chills, nausea, vomiting, CP, and SOB. No further complaints at this time.

## 2019-02-23 ENCOUNTER — EMERGENCY (EMERGENCY)
Facility: HOSPITAL | Age: 44
LOS: 1 days | Discharge: HOME | End: 2019-02-23
Attending: EMERGENCY MEDICINE
Payer: COMMERCIAL

## 2019-02-23 VITALS
RESPIRATION RATE: 16 BRPM | SYSTOLIC BLOOD PRESSURE: 91 MMHG | TEMPERATURE: 98 F | WEIGHT: 139.99 LBS | DIASTOLIC BLOOD PRESSURE: 63 MMHG | OXYGEN SATURATION: 100 % | HEART RATE: 64 BPM | HEIGHT: 61 IN

## 2019-02-23 DIAGNOSIS — Z98.890 OTHER SPECIFIED POSTPROCEDURAL STATES: Chronic | ICD-10-CM

## 2019-02-23 DIAGNOSIS — Z98.51 TUBAL LIGATION STATUS: Chronic | ICD-10-CM

## 2019-02-23 LAB
APPEARANCE UR: CLEAR — SIGNIFICANT CHANGE UP
BACTERIA # UR AUTO: NEGATIVE — SIGNIFICANT CHANGE UP
BILIRUB UR-MCNC: NEGATIVE — SIGNIFICANT CHANGE UP
COLOR SPEC: YELLOW — SIGNIFICANT CHANGE UP
DIFF PNL FLD: ABNORMAL
EPI CELLS # UR: SIGNIFICANT CHANGE UP
GLUCOSE UR QL: NEGATIVE MG/DL — SIGNIFICANT CHANGE UP
HCG UR QL: NEGATIVE — SIGNIFICANT CHANGE UP
KETONES UR-MCNC: NEGATIVE — SIGNIFICANT CHANGE UP
LEUKOCYTE ESTERASE UR-ACNC: NEGATIVE — SIGNIFICANT CHANGE UP
NITRITE UR-MCNC: NEGATIVE — SIGNIFICANT CHANGE UP
PH UR: 6 — SIGNIFICANT CHANGE UP (ref 5–8)
PROT UR-MCNC: NEGATIVE MG/DL — SIGNIFICANT CHANGE UP
RBC CASTS # UR COMP ASSIST: ABNORMAL /HPF (ref 0–4)
SP GR SPEC: 1.01 — SIGNIFICANT CHANGE UP (ref 1.01–1.02)
UROBILINOGEN FLD QL: NEGATIVE MG/DL — SIGNIFICANT CHANGE UP
WBC UR QL: SIGNIFICANT CHANGE UP

## 2019-02-23 PROCEDURE — 87086 URINE CULTURE/COLONY COUNT: CPT

## 2019-02-23 PROCEDURE — 81001 URINALYSIS AUTO W/SCOPE: CPT

## 2019-02-23 PROCEDURE — 82962 GLUCOSE BLOOD TEST: CPT

## 2019-02-23 PROCEDURE — 73630 X-RAY EXAM OF FOOT: CPT

## 2019-02-23 PROCEDURE — 73630 X-RAY EXAM OF FOOT: CPT | Mod: 26,50

## 2019-02-23 PROCEDURE — 73620 X-RAY EXAM OF FOOT: CPT

## 2019-02-23 PROCEDURE — 99283 EMERGENCY DEPT VISIT LOW MDM: CPT

## 2019-02-23 PROCEDURE — 81025 URINE PREGNANCY TEST: CPT

## 2019-02-23 PROCEDURE — 99284 EMERGENCY DEPT VISIT MOD MDM: CPT

## 2019-02-23 PROCEDURE — 73620 X-RAY EXAM OF FOOT: CPT | Mod: 26,50

## 2019-02-23 RX ORDER — IBUPROFEN 200 MG
400 TABLET ORAL ONCE
Qty: 0 | Refills: 0 | Status: COMPLETED | OUTPATIENT
Start: 2019-02-23 | End: 2019-02-23

## 2019-02-23 RX ADMIN — Medication 400 MILLIGRAM(S): at 14:57

## 2019-02-23 NOTE — ED STATDOCS - PROGRESS NOTE DETAILS
Results noted with no acute concerns- findings d/w pt with  at bedside.  Pt instructed to f/u with podiatry and continue with NSAIDS. Pt verbalizes understanding.

## 2019-02-23 NOTE — ED STATDOCS - OBJECTIVE STATEMENT
43 y.o otherwise healthy F presents to ED c/o constant b/l foot pain with swelling and redness starting one month ago. Pt seen by clinic and told she has a fungus, prescription has not been sent. Reports pain when sheets touch her feet and that she has to sleep with her feet hanging off the side of the bed. Additional c/o pyuria with increased urinary frequency for one week. NKDA. Denies fever, chills or additional physical complaints at this time.   LMP: 12/20/2018, hx irregular cycle  : Juju 43 y.o otherwise healthy F presents to ED c/o constant b/l foot pain with swelling and redness starting one month ago. Pt seen by clinic and told she has a fungus, prescription has not been sent. Reports pain when sheets touch her feet and that she has to sleep with her feet hanging off the side of the bed. pain worse in the mornings and at end of day, workson feet. Additional c/o dysuria with increased urinary frequency for one week. NKDA. Denies fever, chills or additional physical complaints at this time.   LMP: 12/20/2018, hx irregular cycle  : Juju

## 2019-02-23 NOTE — ED STATDOCS - NS ED ROS FT
ROS: no CP/SOB. no cough. no fever. no n/v/d/c. no abd pain. no rash. no bleeding. (+) pyuria. no weakness. no vision changes. no HA. no neck/back pain. (+) foot pain. no extremity swelling/deformity. No change in mental status. ROS: no CP/SOB. no cough. no fever. no n/v/d/c. no abd pain. no rash. no bleeding. (+) urinary sx no weakness. no vision changes. no HA. no neck/back pain. (+) foot pain. no extremity swelling/deformity. No change in mental status.

## 2019-02-23 NOTE — ED STATDOCS - ATTENDING CONTRIBUTION TO CARE
I, Shayy Eddy, performed the initial face to face bedside interview with this patient regarding history of present illness, review of symptoms and relevant past medical, social and family history.  I completed an independent physical examination.   The medical decision making and follow-up on ordered tests (ie labs, radiologic studies) and re-evaluation of the patient's status has been communicated to the ACP.  Disposition of the patient will be based on test outcome and response to ED interventions.  The history, relevant review of systems, past medical and surgical history, medical decision making, and physical examination was documented by the scribe in my presence and I attest to the accuracy of the documentation.

## 2019-02-23 NOTE — ED STATDOCS - CLINICAL SUMMARY MEDICAL DECISION MAKING FREE TEXT BOX
43 y.o F presents for chronic foot pain, no obvious findings on exam, possible planta fasciitis, less likely neuropathic pain, also with urinary symptoms, no fever, no back pain, will check finger stick, UA.

## 2019-02-23 NOTE — ED STATDOCS - PHYSICAL EXAMINATION
Gen: NAD, AOx3  Head: NCAT  HEENT: PERRL, EOMI, oral mucosa moist, normal conjunctiva, neck supple  Lung: CTAB, no respiratory distress  CV: rrr, no murmur, Normal perfusion  Abd: soft, NTND, no CVA tenderness  MSK: No edema, no visible deformities. (+) tenderness base of b/l heels, no bony tenderness  Neuro: No focal neurologic deficits  Skin: No rash   Psych: normal affect

## 2019-02-24 ENCOUNTER — EMERGENCY (EMERGENCY)
Facility: HOSPITAL | Age: 44
LOS: 1 days | Discharge: HOME | End: 2019-02-24
Attending: EMERGENCY MEDICINE
Payer: SELF-PAY

## 2019-02-24 VITALS
OXYGEN SATURATION: 97 % | TEMPERATURE: 98 F | RESPIRATION RATE: 14 BRPM | HEART RATE: 77 BPM | WEIGHT: 149.91 LBS | DIASTOLIC BLOOD PRESSURE: 68 MMHG | HEIGHT: 61 IN | SYSTOLIC BLOOD PRESSURE: 100 MMHG

## 2019-02-24 DIAGNOSIS — Z98.51 TUBAL LIGATION STATUS: Chronic | ICD-10-CM

## 2019-02-24 DIAGNOSIS — Z98.890 OTHER SPECIFIED POSTPROCEDURAL STATES: Chronic | ICD-10-CM

## 2019-02-24 LAB
APPEARANCE UR: CLEAR — SIGNIFICANT CHANGE UP
BILIRUB UR-MCNC: NEGATIVE — SIGNIFICANT CHANGE UP
COLOR SPEC: YELLOW — SIGNIFICANT CHANGE UP
CULTURE RESULTS: SIGNIFICANT CHANGE UP
DIFF PNL FLD: ABNORMAL
EPI CELLS # UR: SIGNIFICANT CHANGE UP
GLUCOSE UR QL: NEGATIVE MG/DL — SIGNIFICANT CHANGE UP
HCG UR QL: NEGATIVE — SIGNIFICANT CHANGE UP
KETONES UR-MCNC: NEGATIVE — SIGNIFICANT CHANGE UP
LEUKOCYTE ESTERASE UR-ACNC: ABNORMAL
NITRITE UR-MCNC: NEGATIVE — SIGNIFICANT CHANGE UP
PH UR: 6.5 — SIGNIFICANT CHANGE UP (ref 5–8)
PROT UR-MCNC: NEGATIVE MG/DL — SIGNIFICANT CHANGE UP
RBC CASTS # UR COMP ASSIST: SIGNIFICANT CHANGE UP /HPF (ref 0–4)
SP GR SPEC: 1.01 — SIGNIFICANT CHANGE UP (ref 1.01–1.02)
SPECIMEN SOURCE: SIGNIFICANT CHANGE UP
UROBILINOGEN FLD QL: NEGATIVE MG/DL — SIGNIFICANT CHANGE UP
WBC UR QL: SIGNIFICANT CHANGE UP

## 2019-02-24 PROCEDURE — 99284 EMERGENCY DEPT VISIT MOD MDM: CPT

## 2019-02-24 RX ORDER — ACETAMINOPHEN 500 MG
975 TABLET ORAL ONCE
Qty: 0 | Refills: 0 | Status: COMPLETED | OUTPATIENT
Start: 2019-02-24 | End: 2019-02-24

## 2019-02-24 RX ADMIN — Medication 975 MILLIGRAM(S): at 22:14

## 2019-02-24 NOTE — ED STATDOCS - ATTENDING CONTRIBUTION TO CARE
I, Reinaldo Moe, performed the initial face to face bedside interview with this patient regarding history of present illness, review of symptoms and relevant past medical, social and family history.  I completed an independent physical examination.  I was the initial provider who evaluated this patient. I have signed out the follow up of any pending tests (i.e. labs, radiological studies) to the ACP.  I have communicated the patient’s plan of care and disposition with the ACP.

## 2019-02-24 NOTE — ED ADULT TRIAGE NOTE - NSWEIGHTCALCTOOLDRUG_GEN_A_CORE
I have personally performed a face to face diagnostic evaluation on this patient. I have reviewed the ACP note and agree with the history, exam and plan of care, except as noted.
 used

## 2019-02-24 NOTE — ED STATDOCS - PHYSICAL EXAMINATION
Constitutional: in no apparent distress, speaking in full sentences  HEENT: neck supple, FROM, tongue is pink, moist and midline  EYES: non-injected, non-icteric, PERRL, EOMI  RESPIRATORY: lungs clear  CARDIAC: S1 S2, regular rate, moving chest wall symmetrically, no pedal edema  GI: bowel sounds present, abdomen soft, non-tender, no rebound, no guarding  : no CVA tenderness  MSK: spine is midline, no calf tenderness  SKIN: no jaundice  NEURO: awake and alert Constitutional: in no apparent distress, speaking in full sentences  HEENT: neck supple, FROM, tongue is pink, moist and midline  EYES: non-injected, non-icteric, PERRL, EOMI  RESPIRATORY: lungs clear  CARDIAC: S1 S2, regular rate, moving chest wall symmetrically, no pedal edema  GI: bowel sounds present, abdomen soft, non-tender, no rebound, no guarding  : no CVA tenderness  MSK: spine is midline, no calf tenderness  SKIN: no jaundice  NEURO: awake and alert  Pelvic- No rashes/masses on external genitalia. Internal- cervical OS closed. No rashes or masses seen. scant amt of blood in vaginal vault. No CMT. Small amt of whitish discharge noted.

## 2019-02-24 NOTE — ED STATDOCS - PROGRESS NOTE DETAILS
Pt seen by intake MD and agree with HPI/ROS/PE/Plan. pt has nonspecific endometrial cysts. Will tell pt results and importance of f/u with GYN. Will dc.

## 2019-02-24 NOTE — ED STATDOCS - CLINICAL SUMMARY MEDICAL DECISION MAKING FREE TEXT BOX
43 y.o F presents for vaginal bleeding vs gross hematuria. Pt is poor historian. UA for UTI/pregnancy/STI, GYN exam in room 13, consider US for fibroids if vaginal bleeding and uHCG negative. Will treat symptomatically, check results and reassess.

## 2019-02-24 NOTE — ED STATDOCS - OBJECTIVE STATEMENT
43 y.o otherwise healthy F presents to ED c/o vaginal pain, dysuria, and hematuria noted today. Tolerating p.o. No medications taken for symptoms at home. NKDA. Denies fever, nausea, vomiting, abdominal pain, diarrhea or additional physical complaints at this time.   LNMP: 12/20/2018

## 2019-02-25 PROCEDURE — 76830 TRANSVAGINAL US NON-OB: CPT

## 2019-02-25 PROCEDURE — 76856 US EXAM PELVIC COMPLETE: CPT

## 2019-02-25 PROCEDURE — 81025 URINE PREGNANCY TEST: CPT

## 2019-02-25 PROCEDURE — 87591 N.GONORRHOEAE DNA AMP PROB: CPT

## 2019-02-25 PROCEDURE — 87491 CHLMYD TRACH DNA AMP PROBE: CPT

## 2019-02-25 PROCEDURE — 81001 URINALYSIS AUTO W/SCOPE: CPT

## 2019-02-25 PROCEDURE — 99284 EMERGENCY DEPT VISIT MOD MDM: CPT

## 2019-02-25 PROCEDURE — 76830 TRANSVAGINAL US NON-OB: CPT | Mod: 26

## 2019-02-25 PROCEDURE — 87086 URINE CULTURE/COLONY COUNT: CPT

## 2019-02-25 PROCEDURE — T1013: CPT

## 2019-02-25 PROCEDURE — 76856 US EXAM PELVIC COMPLETE: CPT | Mod: 26

## 2019-02-25 RX ORDER — CEPHALEXIN 500 MG
1 CAPSULE ORAL
Qty: 28 | Refills: 0
Start: 2019-02-25 | End: 2019-03-03

## 2019-02-25 NOTE — ED ADULT NURSE NOTE - NSIMPLEMENTINTERV_GEN_ALL_ED
Implemented All Universal Safety Interventions:  [place] to call system. Call bell, personal items and telephone within reach. Instruct patient to call for assistance. Room bathroom lighting operational. Non-slip footwear when patient is off stretcher. Physically safe environment: no spills, clutter or unnecessary equipment. Stretcher in lowest position, wheels locked, appropriate side rails in place.

## 2019-02-26 LAB
C TRACH RRNA SPEC QL NAA+PROBE: SIGNIFICANT CHANGE UP
CULTURE RESULTS: NO GROWTH — SIGNIFICANT CHANGE UP
N GONORRHOEA RRNA SPEC QL NAA+PROBE: SIGNIFICANT CHANGE UP
SPECIMEN SOURCE: SIGNIFICANT CHANGE UP
SPECIMEN SOURCE: SIGNIFICANT CHANGE UP

## 2019-07-21 PROBLEM — Z00.00 ENCOUNTER FOR PREVENTIVE HEALTH EXAMINATION: Status: ACTIVE | Noted: 2019-07-21

## 2019-07-28 ENCOUNTER — EMERGENCY (EMERGENCY)
Facility: HOSPITAL | Age: 44
LOS: 1 days | Discharge: HOME | End: 2019-07-28
Attending: EMERGENCY MEDICINE
Payer: COMMERCIAL

## 2019-07-28 VITALS
HEART RATE: 76 BPM | DIASTOLIC BLOOD PRESSURE: 55 MMHG | OXYGEN SATURATION: 98 % | WEIGHT: 151.9 LBS | HEIGHT: 62 IN | SYSTOLIC BLOOD PRESSURE: 99 MMHG | TEMPERATURE: 98 F | RESPIRATION RATE: 18 BRPM

## 2019-07-28 DIAGNOSIS — Z98.890 OTHER SPECIFIED POSTPROCEDURAL STATES: Chronic | ICD-10-CM

## 2019-07-28 DIAGNOSIS — Z98.51 TUBAL LIGATION STATUS: Chronic | ICD-10-CM

## 2019-07-28 LAB
APPEARANCE UR: CLEAR — SIGNIFICANT CHANGE UP
BILIRUB UR-MCNC: NEGATIVE — SIGNIFICANT CHANGE UP
COLOR SPEC: YELLOW — SIGNIFICANT CHANGE UP
DIFF PNL FLD: ABNORMAL
GLUCOSE UR QL: NEGATIVE MG/DL — SIGNIFICANT CHANGE UP
HCG UR QL: NEGATIVE — SIGNIFICANT CHANGE UP
KETONES UR-MCNC: NEGATIVE — SIGNIFICANT CHANGE UP
LEUKOCYTE ESTERASE UR-ACNC: ABNORMAL
NITRITE UR-MCNC: NEGATIVE — SIGNIFICANT CHANGE UP
PH UR: 7 — SIGNIFICANT CHANGE UP (ref 5–8)
PROT UR-MCNC: 30 MG/DL
SP GR SPEC: 1 — LOW (ref 1.01–1.02)
UROBILINOGEN FLD QL: NEGATIVE MG/DL — SIGNIFICANT CHANGE UP

## 2019-07-28 PROCEDURE — 81001 URINALYSIS AUTO W/SCOPE: CPT

## 2019-07-28 PROCEDURE — 99283 EMERGENCY DEPT VISIT LOW MDM: CPT

## 2019-07-28 PROCEDURE — 81025 URINE PREGNANCY TEST: CPT

## 2019-07-28 RX ORDER — PHENAZOPYRIDINE HCL 100 MG
200 TABLET ORAL ONCE
Refills: 0 | Status: COMPLETED | OUTPATIENT
Start: 2019-07-28 | End: 2019-07-28

## 2019-07-28 RX ORDER — CEPHALEXIN 500 MG
1 CAPSULE ORAL
Qty: 28 | Refills: 0
Start: 2019-07-28 | End: 2019-08-03

## 2019-07-28 RX ORDER — CEPHALEXIN 500 MG
500 CAPSULE ORAL ONCE
Refills: 0 | Status: DISCONTINUED | OUTPATIENT
Start: 2019-07-28 | End: 2019-08-09

## 2019-07-28 RX ADMIN — Medication 200 MILLIGRAM(S): at 19:01

## 2019-07-28 NOTE — ED STATDOCS - OBJECTIVE STATEMENT
42 y/o F pt presents to the ED c/o dysuria, hematuria and urinary frequency for the past 2 days. Pt reports she is only able to produces  a small amount of urine each time she tries to urinate. Pt reports she has had these symptoms in the past when she had an UTI. Pt reports no hx of kidney stones. Denies fever, vomiting, and back pain. No further complaints at this time.

## 2019-07-28 NOTE — ED STATDOCS - PROGRESS NOTE DETAILS
PT evaluated by intake physician. HPI/PE/ROS as noted above. Will follow up plan per intake physician. reviewed urine, will treat for uti, pt explained d/c instructions

## 2019-07-28 NOTE — ED STATDOCS - NS_ ATTENDINGSCRIBEDETAILS _ED_A_ED_FT
I, Mayte Laughlin, performed the initial face to face bedside interview with this patient regarding history of present illness, review of symptoms and relevant past medical, social and family history.  I completed an independent physical examination.  I was the provider who initially evaluated this patient.  The history, relevant review of systems, past medical and surgical history, medical decision making, and physical examination was documented by the scribe in my presence and I attest to the accuracy of the documentation. Follow-up on ordered tests (ie labs, radiologic studies) and re-evaluation of the patient's status has been communicated to the ACP.  Disposition of the patient will be based on test outcome and response to ED interventions.

## 2019-07-28 NOTE — ED ADULT TRIAGE NOTE - CHIEF COMPLAINT QUOTE
'I am having pain in my privates when I have to urinate I feel like I have to go but it is only a little urine and it has an odor" Pt A & [employer].

## 2019-08-02 ENCOUNTER — APPOINTMENT (OUTPATIENT)
Dept: OBGYN | Facility: CLINIC | Age: 44
End: 2019-08-02
Payer: MEDICAID

## 2019-08-02 VITALS
BODY MASS INDEX: 30.91 KG/M2 | HEART RATE: 72 BPM | SYSTOLIC BLOOD PRESSURE: 88 MMHG | WEIGHT: 153.31 LBS | DIASTOLIC BLOOD PRESSURE: 59 MMHG | HEIGHT: 59 IN

## 2019-08-02 DIAGNOSIS — Z78.9 OTHER SPECIFIED HEALTH STATUS: ICD-10-CM

## 2019-08-02 PROCEDURE — 99204 OFFICE O/P NEW MOD 45 MIN: CPT

## 2019-08-05 LAB
C TRACH RRNA SPEC QL NAA+PROBE: NOT DETECTED
HPV HIGH+LOW RISK DNA PNL CVX: NOT DETECTED
N GONORRHOEA RRNA SPEC QL NAA+PROBE: NOT DETECTED
SOURCE TP AMPLIFICATION: NORMAL

## 2019-08-07 LAB — CYTOLOGY CVX/VAG DOC THIN PREP: NORMAL

## 2019-08-09 ENCOUNTER — APPOINTMENT (OUTPATIENT)
Dept: ANTEPARTUM | Facility: CLINIC | Age: 44
End: 2019-08-09

## 2019-08-26 ENCOUNTER — APPOINTMENT (OUTPATIENT)
Dept: OBGYN | Facility: CLINIC | Age: 44
End: 2019-08-26

## 2019-09-29 ENCOUNTER — EMERGENCY (EMERGENCY)
Facility: HOSPITAL | Age: 44
LOS: 1 days | Discharge: HOME | End: 2019-09-29
Attending: EMERGENCY MEDICINE
Payer: COMMERCIAL

## 2019-09-29 VITALS
TEMPERATURE: 98 F | DIASTOLIC BLOOD PRESSURE: 67 MMHG | RESPIRATION RATE: 18 BRPM | SYSTOLIC BLOOD PRESSURE: 98 MMHG | HEIGHT: 61 IN | OXYGEN SATURATION: 97 % | WEIGHT: 134.92 LBS | HEART RATE: 66 BPM

## 2019-09-29 DIAGNOSIS — Z98.890 OTHER SPECIFIED POSTPROCEDURAL STATES: Chronic | ICD-10-CM

## 2019-09-29 DIAGNOSIS — Z98.51 TUBAL LIGATION STATUS: Chronic | ICD-10-CM

## 2019-09-29 LAB
ALBUMIN SERPL ELPH-MCNC: 3.9 G/DL — SIGNIFICANT CHANGE UP (ref 3.3–5.2)
ALP SERPL-CCNC: 83 U/L — SIGNIFICANT CHANGE UP (ref 40–120)
ALT FLD-CCNC: 15 U/L — SIGNIFICANT CHANGE UP
ANION GAP SERPL CALC-SCNC: 9 MMOL/L — SIGNIFICANT CHANGE UP (ref 5–17)
APPEARANCE UR: CLEAR — SIGNIFICANT CHANGE UP
AST SERPL-CCNC: 20 U/L — SIGNIFICANT CHANGE UP
BACTERIA # UR AUTO: ABNORMAL
BASOPHILS # BLD AUTO: 0.01 K/UL — SIGNIFICANT CHANGE UP (ref 0–0.2)
BASOPHILS NFR BLD AUTO: 0.2 % — SIGNIFICANT CHANGE UP (ref 0–2)
BILIRUB SERPL-MCNC: 0.4 MG/DL — SIGNIFICANT CHANGE UP (ref 0.4–2)
BILIRUB UR-MCNC: NEGATIVE — SIGNIFICANT CHANGE UP
BUN SERPL-MCNC: 14 MG/DL — SIGNIFICANT CHANGE UP (ref 8–20)
CALCIUM SERPL-MCNC: 9.2 MG/DL — SIGNIFICANT CHANGE UP (ref 8.6–10.2)
CHLORIDE SERPL-SCNC: 106 MMOL/L — SIGNIFICANT CHANGE UP (ref 98–107)
CO2 SERPL-SCNC: 26 MMOL/L — SIGNIFICANT CHANGE UP (ref 22–29)
COLOR SPEC: YELLOW — SIGNIFICANT CHANGE UP
CREAT SERPL-MCNC: 0.69 MG/DL — SIGNIFICANT CHANGE UP (ref 0.5–1.3)
DIFF PNL FLD: ABNORMAL
EOSINOPHIL # BLD AUTO: 0.05 K/UL — SIGNIFICANT CHANGE UP (ref 0–0.5)
EOSINOPHIL NFR BLD AUTO: 0.8 % — SIGNIFICANT CHANGE UP (ref 0–6)
EPI CELLS # UR: SIGNIFICANT CHANGE UP
GLUCOSE SERPL-MCNC: 104 MG/DL — SIGNIFICANT CHANGE UP (ref 70–115)
GLUCOSE UR QL: NEGATIVE MG/DL — SIGNIFICANT CHANGE UP
HCG UR QL: NEGATIVE — SIGNIFICANT CHANGE UP
HCT VFR BLD CALC: 32.5 % — LOW (ref 34.5–45)
HGB BLD-MCNC: 10.6 G/DL — LOW (ref 11.5–15.5)
IMM GRANULOCYTES NFR BLD AUTO: 0.2 % — SIGNIFICANT CHANGE UP (ref 0–1.5)
KETONES UR-MCNC: NEGATIVE — SIGNIFICANT CHANGE UP
LEUKOCYTE ESTERASE UR-ACNC: ABNORMAL
LIDOCAIN IGE QN: 28 U/L — SIGNIFICANT CHANGE UP (ref 22–51)
LYMPHOCYTES # BLD AUTO: 2.15 K/UL — SIGNIFICANT CHANGE UP (ref 1–3.3)
LYMPHOCYTES # BLD AUTO: 36 % — SIGNIFICANT CHANGE UP (ref 13–44)
MCHC RBC-ENTMCNC: 31.4 PG — SIGNIFICANT CHANGE UP (ref 27–34)
MCHC RBC-ENTMCNC: 32.6 GM/DL — SIGNIFICANT CHANGE UP (ref 32–36)
MCV RBC AUTO: 96.2 FL — SIGNIFICANT CHANGE UP (ref 80–100)
MONOCYTES # BLD AUTO: 0.6 K/UL — SIGNIFICANT CHANGE UP (ref 0–0.9)
MONOCYTES NFR BLD AUTO: 10 % — SIGNIFICANT CHANGE UP (ref 2–14)
NEUTROPHILS # BLD AUTO: 3.16 K/UL — SIGNIFICANT CHANGE UP (ref 1.8–7.4)
NEUTROPHILS NFR BLD AUTO: 52.8 % — SIGNIFICANT CHANGE UP (ref 43–77)
NITRITE UR-MCNC: NEGATIVE — SIGNIFICANT CHANGE UP
PH UR: 6 — SIGNIFICANT CHANGE UP (ref 5–8)
PLATELET # BLD AUTO: 200 K/UL — SIGNIFICANT CHANGE UP (ref 150–400)
POTASSIUM SERPL-MCNC: 4.1 MMOL/L — SIGNIFICANT CHANGE UP (ref 3.5–5.3)
POTASSIUM SERPL-SCNC: 4.1 MMOL/L — SIGNIFICANT CHANGE UP (ref 3.5–5.3)
PROT SERPL-MCNC: 6.6 G/DL — SIGNIFICANT CHANGE UP (ref 6.6–8.7)
PROT UR-MCNC: 30 MG/DL
RBC # BLD: 3.38 M/UL — LOW (ref 3.8–5.2)
RBC # FLD: 12.6 % — SIGNIFICANT CHANGE UP (ref 10.3–14.5)
RBC CASTS # UR COMP ASSIST: SIGNIFICANT CHANGE UP /HPF (ref 0–4)
SODIUM SERPL-SCNC: 141 MMOL/L — SIGNIFICANT CHANGE UP (ref 135–145)
SP GR SPEC: 1.02 — SIGNIFICANT CHANGE UP (ref 1.01–1.02)
UROBILINOGEN FLD QL: 4 MG/DL
WBC # BLD: 5.98 K/UL — SIGNIFICANT CHANGE UP (ref 3.8–10.5)
WBC # FLD AUTO: 5.98 K/UL — SIGNIFICANT CHANGE UP (ref 3.8–10.5)
WBC UR QL: SIGNIFICANT CHANGE UP

## 2019-09-29 PROCEDURE — T1013: CPT

## 2019-09-29 PROCEDURE — 83690 ASSAY OF LIPASE: CPT

## 2019-09-29 PROCEDURE — 87086 URINE CULTURE/COLONY COUNT: CPT

## 2019-09-29 PROCEDURE — 99283 EMERGENCY DEPT VISIT LOW MDM: CPT

## 2019-09-29 PROCEDURE — 81025 URINE PREGNANCY TEST: CPT

## 2019-09-29 PROCEDURE — 80053 COMPREHEN METABOLIC PANEL: CPT

## 2019-09-29 PROCEDURE — 99284 EMERGENCY DEPT VISIT MOD MDM: CPT

## 2019-09-29 PROCEDURE — 93005 ELECTROCARDIOGRAM TRACING: CPT

## 2019-09-29 PROCEDURE — 93010 ELECTROCARDIOGRAM REPORT: CPT

## 2019-09-29 PROCEDURE — 36415 COLL VENOUS BLD VENIPUNCTURE: CPT

## 2019-09-29 PROCEDURE — 81001 URINALYSIS AUTO W/SCOPE: CPT

## 2019-09-29 PROCEDURE — 85027 COMPLETE CBC AUTOMATED: CPT

## 2019-09-29 RX ORDER — FAMOTIDINE 10 MG/ML
20 INJECTION INTRAVENOUS DAILY
Refills: 0 | Status: DISCONTINUED | OUTPATIENT
Start: 2019-09-29 | End: 2019-10-05

## 2019-09-29 RX ORDER — NITROFURANTOIN MACROCRYSTAL 50 MG
1 CAPSULE ORAL
Qty: 14 | Refills: 0
Start: 2019-09-29 | End: 2019-10-05

## 2019-09-29 RX ORDER — NITROFURANTOIN MACROCRYSTAL 50 MG
100 CAPSULE ORAL ONCE
Refills: 0 | Status: COMPLETED | OUTPATIENT
Start: 2019-09-29 | End: 2019-09-29

## 2019-09-29 RX ADMIN — FAMOTIDINE 20 MILLIGRAM(S): 10 INJECTION INTRAVENOUS at 20:53

## 2019-09-29 RX ADMIN — Medication 100 MILLIGRAM(S): at 22:28

## 2019-09-29 RX ADMIN — Medication 30 MILLILITER(S): at 20:53

## 2019-09-29 NOTE — ED PROVIDER NOTE - OBJECTIVE STATEMENT
Patient is a 42 y/o female with a pmhx of UTI presenting for dysuria and epigastric abdominal pain. Patient states today started with a burning sensation in the epigastric region, denies nausea or vomiting. Patient denies abdominal surgeries, hx of gallstones Patient also stating has dysuria that started today, feels "like a UTI". Patient denies cp, SOB, cardiac hx, nausea, vomiting, diarrhea, fevers, back pain, hematuria, vaginal discharge

## 2019-09-29 NOTE — ED PROVIDER NOTE - PATIENT PORTAL LINK FT
You can access the FollowMyHealth Patient Portal offered by [facility] by registering at the following website: [email or web address]. By joining [email or web address]’s FollowMyHealth portal, you will also be able to view your health information using other applications (apps) compatible with our system.

## 2019-09-29 NOTE — ED ADULT TRIAGE NOTE - CHIEF COMPLAINT QUOTE
patient states that she has a UTI burning with urination and frequency, was here 2 months ago with same and stomach is burning

## 2019-09-29 NOTE — ED PROVIDER NOTE - PHYSICAL EXAMINATION
Const: Awake, alert and oriented. In no acute distress. Well appearing.  HEENT: NC/AT. Moist mucous membranes.  Eyes: Conjunctiva pink, sclera white bilaterally. EOMI.  Neck:. Soft and supple. Full ROM without pain.  Cardiac:  +S1/S2. No murmurs. Peripheral pulses 2+ and symmetric. No LE edema.  Resp: Speaking in full sentences. No evidence of respiratory distress. No wheezes, rales or rhonchi.  Abd: Soft, mild epigastric tenderness on palpation, non-distended. Normal bowel sounds in all 4 quadrants. No guarding or rebound.  Back: Spine midline and non-tender. No CVAT.  Skin: No rashes, abrasions or lacerations.  Lymph: No cervical lymphadenopathy.  Neuro: Awake, alert & oriented x 3. CN II-XII intact, Moves all extremities symmetrically.

## 2019-09-29 NOTE — ED PROVIDER NOTE - PROGRESS NOTE DETAILS
abd soft nontender, reviewed lab work, ekg, and urine, will tx for uti patient symptomatic, follow up with pmd, pt explained d/c instructions

## 2019-10-01 LAB
CULTURE RESULTS: NO GROWTH — SIGNIFICANT CHANGE UP
SPECIMEN SOURCE: SIGNIFICANT CHANGE UP

## 2019-10-09 ENCOUNTER — APPOINTMENT (OUTPATIENT)
Dept: OBGYN | Facility: CLINIC | Age: 44
End: 2019-10-09
Payer: MEDICAID

## 2019-10-09 VITALS
BODY MASS INDEX: 31.04 KG/M2 | DIASTOLIC BLOOD PRESSURE: 63 MMHG | WEIGHT: 154 LBS | HEART RATE: 68 BPM | SYSTOLIC BLOOD PRESSURE: 93 MMHG | HEIGHT: 59 IN

## 2019-10-09 PROCEDURE — 58558Z: CUSTOM

## 2019-10-09 RX ORDER — TERCONAZOLE 4 MG/G
0.4 CREAM VAGINAL DAILY
Qty: 1 | Refills: 3 | Status: ACTIVE | COMMUNITY
Start: 2019-10-09 | End: 1900-01-01

## 2019-10-09 RX ORDER — NITROFURANTOIN (MONOHYDRATE/MACROCRYSTALS) 25; 75 MG/1; MG/1
100 CAPSULE ORAL
Qty: 14 | Refills: 0 | Status: ACTIVE | COMMUNITY
Start: 2019-10-09 | End: 1900-01-01

## 2019-10-12 LAB — CORE LAB BIOPSY: NORMAL

## 2019-10-28 NOTE — ED ADULT TRIAGE NOTE - BP NONINVASIVE SYSTOLIC (MM HG)
"CST  Return Visit with Renee Rendon PA-C  [facility] ([facility] [address] [id number] [phone]   Nov 27, 2019  1:10 PM CST  Return Visit with ARTIS Plaza CNP  [facility] ([facility]) [address] [facility] [address]  [facility] [id number] [phone]   Dec 16, 2019  9:15 AM CST  Return Visit with Robinson Villaseñor MD  [facility] ([facility]) [address] [place] [address] [address]  [facility] [id number] [phone] CST  Return Visit with Renee Rendon PA-C  [facility] ([facility]) [address] [id number] [phone]   Nov 27, 2019  1:10 PM CST  Return Visit with ARTIS Plaza CNP  [facility] ([facility]) [address]  [facility] [address]  [phone]   Dec 16, 2019  9:15 AM CST  Return Visit with Robinson Villaseñor MD  [facility] ([facility]) [address] [place] [address] [address]  [facility] [address]  [phone] 18 or older           " 96

## 2019-11-13 ENCOUNTER — APPOINTMENT (OUTPATIENT)
Dept: OBGYN | Facility: CLINIC | Age: 44
End: 2019-11-13
Payer: MEDICAID

## 2019-11-13 VITALS
HEIGHT: 59 IN | DIASTOLIC BLOOD PRESSURE: 70 MMHG | WEIGHT: 154.19 LBS | SYSTOLIC BLOOD PRESSURE: 120 MMHG | BODY MASS INDEX: 31.08 KG/M2

## 2019-11-13 DIAGNOSIS — N94.10 UNSPECIFIED DYSPAREUNIA: ICD-10-CM

## 2019-11-13 DIAGNOSIS — N92.1 EXCESSIVE AND FREQUENT MENSTRUATION WITH IRREGULAR CYCLE: ICD-10-CM

## 2019-11-13 DIAGNOSIS — Z12.39 ENCOUNTER FOR OTHER SCREENING FOR MALIGNANT NEOPLASM OF BREAST: ICD-10-CM

## 2019-11-13 DIAGNOSIS — R10.2 PELVIC AND PERINEAL PAIN: ICD-10-CM

## 2019-11-13 PROCEDURE — 99213 OFFICE O/P EST LOW 20 MIN: CPT

## 2019-11-13 RX ORDER — MEDROXYPROGESTERONE ACETATE 10 MG/1
10 TABLET ORAL DAILY
Qty: 12 | Refills: 5 | Status: ACTIVE | COMMUNITY
Start: 2019-11-13 | End: 1900-01-01

## 2019-12-06 ENCOUNTER — APPOINTMENT (OUTPATIENT)
Dept: ANTEPARTUM | Facility: CLINIC | Age: 44
End: 2019-12-06
Payer: MEDICAID

## 2019-12-06 ENCOUNTER — ASOB RESULT (OUTPATIENT)
Age: 44
End: 2019-12-06

## 2019-12-06 ENCOUNTER — EMERGENCY (EMERGENCY)
Facility: HOSPITAL | Age: 44
LOS: 1 days | Discharge: HOME | End: 2019-12-06
Attending: EMERGENCY MEDICINE
Payer: COMMERCIAL

## 2019-12-06 VITALS
RESPIRATION RATE: 20 BRPM | OXYGEN SATURATION: 98 % | WEIGHT: 153 LBS | TEMPERATURE: 98 F | DIASTOLIC BLOOD PRESSURE: 62 MMHG | HEART RATE: 82 BPM | HEIGHT: 59 IN | SYSTOLIC BLOOD PRESSURE: 92 MMHG

## 2019-12-06 DIAGNOSIS — Z98.51 TUBAL LIGATION STATUS: Chronic | ICD-10-CM

## 2019-12-06 DIAGNOSIS — Z98.890 OTHER SPECIFIED POSTPROCEDURAL STATES: Chronic | ICD-10-CM

## 2019-12-06 PROCEDURE — 76830 TRANSVAGINAL US NON-OB: CPT

## 2019-12-06 PROCEDURE — T1013: CPT

## 2019-12-06 PROCEDURE — 99283 EMERGENCY DEPT VISIT LOW MDM: CPT

## 2019-12-06 PROCEDURE — 76856 US EXAM PELVIC COMPLETE: CPT | Mod: 59

## 2019-12-06 PROCEDURE — 99282 EMERGENCY DEPT VISIT SF MDM: CPT

## 2019-12-06 NOTE — ED PROVIDER NOTE - PHYSICAL EXAMINATION
GENERAL:  WNWD Middle-Aged F  EYE: EOMI, symmetrical lids, normal conjunctiva WITHOUT PALLOR  Ears: Atraumatic external ears B/L. No TTP over B/L mastoid, no erythema overlying mastoid or pre/post auricular soft tissues B/L, B/L TM visualized without erythema, wax noted in external auditory meatus B/L.   Nose: atraumatic external nose, non-deviated septum.  Mouth: moist mucous membranes in oropharynx  Throat: No frontal, maxillary, ethmoid sinus TTP.   NECK: Symmetric, no tracheal deviation  CVS: RRR, No murmurs appreciated.  RESP: Unlabored respiratory effort, no central cyanosis, no evidence for respiratory distress, lungs CTAB  MSK: No gross deformities. No ROM limitation.   DERM: Skin is warm, dry. No rashes or lesions appreciated during exam.  NEURO: AAOx3. Moving all 4 extremities without limitation. No facial droop. No dysarthria.  PSYCH: Appropriate mood and affect during encounter.

## 2019-12-06 NOTE — ED PROVIDER NOTE - NSFOLLOWUPINSTRUCTIONS_ED_ALL_ED_FT
Comprarse tylenol, ibuprofen para elludar con el dolor. Tambien, lino much fluidos (agua) y comer comida con proteina para elludar ortiz cuerpo con el gripe. Un otro medicamento para comprar en la ramírez/pharmacia es "CORICIDIN HBP Cough and Cold"

## 2019-12-06 NOTE — ED PROVIDER NOTE - NS ED ROS FT
[place] GENERAL:   No fever, no chills  SKIN:   No Rashes  EYE:   Vision unchanged.  ENMT:  +ear pain.   PULM:   +Cough  CVS:   +Chest Pain, endorses only when coughing  GI:   Denies abdominal pain.  : Denies dysuria.   MSK: Denies muscle pain.  NEURO: Denies HA.

## 2019-12-06 NOTE — ED PROVIDER NOTE - OBJECTIVE STATEMENT
43y F seen in [facility] ED multiple times in 2019 for primary care level of care p/w CC of left sided ear pain. Patient states cough, congestion, runny nose, and generalized achiness for 3 days with ear pain x 1 day. Patient is unable to characterize the pain but says the pain is inside her left ear. Patient denies hearing change in the ear, denies neck pain, denies sore throat, endorses chest pain while cough. Patient denies receiving flu shot this year.

## 2019-12-06 NOTE — ED PROVIDER NOTE - CLINICAL SUMMARY MEDICAL DECISION MAKING FREE TEXT BOX
43y F 3x day hx flu-like symptoms w/ new onset left sided ear pain. Normal ENT examination, cranial nerve function intact, normal ocular exam. VSSAF, patient is safe to be d/c home and advised to use OTC tylenol & ibuprofen for pain relief and coricidin HBP cough and cold.

## 2019-12-06 NOTE — ED PROVIDER NOTE - PATIENT PORTAL LINK FT
You can access the FollowMyHealth Patient Portal offered by [facility] by registering at the following website: [email or web address]. By joining [employer]’s FollowMyHealth portal, you will also be able to view your health information using other applications (apps) compatible with our system.

## 2019-12-25 PROBLEM — R10.2 PELVIC PAIN IN FEMALE: Status: ACTIVE | Noted: 2019-08-02

## 2020-02-03 ENCOUNTER — EMERGENCY (EMERGENCY)
Facility: HOSPITAL | Age: 45
LOS: 1 days | Discharge: HOME | End: 2020-02-03
Attending: EMERGENCY MEDICINE
Payer: COMMERCIAL

## 2020-02-03 VITALS
OXYGEN SATURATION: 99 % | RESPIRATION RATE: 18 BRPM | SYSTOLIC BLOOD PRESSURE: 98 MMHG | HEART RATE: 74 BPM | WEIGHT: 149.91 LBS | TEMPERATURE: 98 F | DIASTOLIC BLOOD PRESSURE: 53 MMHG

## 2020-02-03 DIAGNOSIS — Z98.890 OTHER SPECIFIED POSTPROCEDURAL STATES: Chronic | ICD-10-CM

## 2020-02-03 DIAGNOSIS — Z98.51 TUBAL LIGATION STATUS: Chronic | ICD-10-CM

## 2020-02-03 PROCEDURE — 99285 EMERGENCY DEPT VISIT HI MDM: CPT

## 2020-02-03 NOTE — ED ADULT TRIAGE NOTE - CHIEF COMPLAINT QUOTE
c/o nausea and dizziness x1week with visual disturbances, reporting several episodes of dizziness and blackouts, denies symptoms at this time

## 2020-02-04 VITALS
RESPIRATION RATE: 17 BRPM | DIASTOLIC BLOOD PRESSURE: 73 MMHG | TEMPERATURE: 98 F | OXYGEN SATURATION: 99 % | HEART RATE: 64 BPM | SYSTOLIC BLOOD PRESSURE: 103 MMHG

## 2020-02-04 LAB
ALBUMIN SERPL ELPH-MCNC: 3.9 G/DL — SIGNIFICANT CHANGE UP (ref 3.3–5.2)
ALP SERPL-CCNC: 107 U/L — SIGNIFICANT CHANGE UP (ref 40–120)
ALT FLD-CCNC: 29 U/L — SIGNIFICANT CHANGE UP
ANION GAP SERPL CALC-SCNC: 11 MMOL/L — SIGNIFICANT CHANGE UP (ref 5–17)
AST SERPL-CCNC: 28 U/L — SIGNIFICANT CHANGE UP
BASOPHILS # BLD AUTO: 0.01 K/UL — SIGNIFICANT CHANGE UP (ref 0–0.2)
BASOPHILS NFR BLD AUTO: 0.2 % — SIGNIFICANT CHANGE UP (ref 0–2)
BILIRUB SERPL-MCNC: 0.4 MG/DL — SIGNIFICANT CHANGE UP (ref 0.4–2)
BUN SERPL-MCNC: 15 MG/DL — SIGNIFICANT CHANGE UP (ref 8–20)
CALCIUM SERPL-MCNC: 9 MG/DL — SIGNIFICANT CHANGE UP (ref 8.6–10.2)
CHLORIDE SERPL-SCNC: 101 MMOL/L — SIGNIFICANT CHANGE UP (ref 98–107)
CO2 SERPL-SCNC: 25 MMOL/L — SIGNIFICANT CHANGE UP (ref 22–29)
CREAT SERPL-MCNC: 0.47 MG/DL — LOW (ref 0.5–1.3)
EOSINOPHIL # BLD AUTO: 0.07 K/UL — SIGNIFICANT CHANGE UP (ref 0–0.5)
EOSINOPHIL NFR BLD AUTO: 1.6 % — SIGNIFICANT CHANGE UP (ref 0–6)
GLUCOSE SERPL-MCNC: 93 MG/DL — SIGNIFICANT CHANGE UP (ref 70–99)
HCT VFR BLD CALC: 33.3 % — LOW (ref 34.5–45)
HGB BLD-MCNC: 10.9 G/DL — LOW (ref 11.5–15.5)
IMM GRANULOCYTES NFR BLD AUTO: 0 % — SIGNIFICANT CHANGE UP (ref 0–1.5)
LYMPHOCYTES # BLD AUTO: 1.77 K/UL — SIGNIFICANT CHANGE UP (ref 1–3.3)
LYMPHOCYTES # BLD AUTO: 40.1 % — SIGNIFICANT CHANGE UP (ref 13–44)
MCHC RBC-ENTMCNC: 30.4 PG — SIGNIFICANT CHANGE UP (ref 27–34)
MCHC RBC-ENTMCNC: 32.7 GM/DL — SIGNIFICANT CHANGE UP (ref 32–36)
MCV RBC AUTO: 92.8 FL — SIGNIFICANT CHANGE UP (ref 80–100)
MONOCYTES # BLD AUTO: 0.45 K/UL — SIGNIFICANT CHANGE UP (ref 0–0.9)
MONOCYTES NFR BLD AUTO: 10.2 % — SIGNIFICANT CHANGE UP (ref 2–14)
NEUTROPHILS # BLD AUTO: 2.11 K/UL — SIGNIFICANT CHANGE UP (ref 1.8–7.4)
NEUTROPHILS NFR BLD AUTO: 47.9 % — SIGNIFICANT CHANGE UP (ref 43–77)
PLATELET # BLD AUTO: 204 K/UL — SIGNIFICANT CHANGE UP (ref 150–400)
POTASSIUM SERPL-MCNC: 3.5 MMOL/L — SIGNIFICANT CHANGE UP (ref 3.5–5.3)
POTASSIUM SERPL-SCNC: 3.5 MMOL/L — SIGNIFICANT CHANGE UP (ref 3.5–5.3)
PROT SERPL-MCNC: 6.6 G/DL — SIGNIFICANT CHANGE UP (ref 6.6–8.7)
RBC # BLD: 3.59 M/UL — LOW (ref 3.8–5.2)
RBC # FLD: 12.3 % — SIGNIFICANT CHANGE UP (ref 10.3–14.5)
SODIUM SERPL-SCNC: 137 MMOL/L — SIGNIFICANT CHANGE UP (ref 135–145)
TROPONIN T SERPL-MCNC: <0.01 NG/ML — SIGNIFICANT CHANGE UP (ref 0–0.06)
WBC # BLD: 4.41 K/UL — SIGNIFICANT CHANGE UP (ref 3.8–10.5)
WBC # FLD AUTO: 4.41 K/UL — SIGNIFICANT CHANGE UP (ref 3.8–10.5)

## 2020-02-04 PROCEDURE — T1013: CPT

## 2020-02-04 PROCEDURE — 93010 ELECTROCARDIOGRAM REPORT: CPT

## 2020-02-04 PROCEDURE — 84484 ASSAY OF TROPONIN QUANT: CPT

## 2020-02-04 PROCEDURE — 80053 COMPREHEN METABOLIC PANEL: CPT

## 2020-02-04 PROCEDURE — 93005 ELECTROCARDIOGRAM TRACING: CPT

## 2020-02-04 PROCEDURE — 36415 COLL VENOUS BLD VENIPUNCTURE: CPT

## 2020-02-04 PROCEDURE — 96374 THER/PROPH/DIAG INJ IV PUSH: CPT

## 2020-02-04 PROCEDURE — 85027 COMPLETE CBC AUTOMATED: CPT

## 2020-02-04 PROCEDURE — 99284 EMERGENCY DEPT VISIT MOD MDM: CPT | Mod: 25

## 2020-02-04 RX ORDER — SODIUM CHLORIDE 9 MG/ML
1000 INJECTION INTRAMUSCULAR; INTRAVENOUS; SUBCUTANEOUS ONCE
Refills: 0 | Status: COMPLETED | OUTPATIENT
Start: 2020-02-04 | End: 2020-02-04

## 2020-02-04 RX ORDER — MECLIZINE HCL 12.5 MG
25 TABLET ORAL ONCE
Refills: 0 | Status: COMPLETED | OUTPATIENT
Start: 2020-02-04 | End: 2020-02-04

## 2020-02-04 RX ORDER — ONDANSETRON 8 MG/1
4 TABLET, FILM COATED ORAL ONCE
Refills: 0 | Status: COMPLETED | OUTPATIENT
Start: 2020-02-04 | End: 2020-02-04

## 2020-02-04 RX ADMIN — SODIUM CHLORIDE 1000 MILLILITER(S): 9 INJECTION INTRAMUSCULAR; INTRAVENOUS; SUBCUTANEOUS at 02:19

## 2020-02-04 RX ADMIN — Medication 25 MILLIGRAM(S): at 02:18

## 2020-02-04 RX ADMIN — ONDANSETRON 4 MILLIGRAM(S): 8 TABLET, FILM COATED ORAL at 02:18

## 2020-02-04 NOTE — ED PROVIDER NOTE - PATIENT PORTAL LINK FT
You can access the FollowMyHealth Patient Portal offered by [facility] by registering at the following website: [email or web address]. By joining [employer]’s FollowMyHealth portal, you will also be able to view your health information using other applications (apps) compatible with our system.

## 2020-02-04 NOTE — ED PROVIDER NOTE - PHYSICAL EXAMINATION
Const: Awake, alert and oriented. In no acute distress. Well appearing.  HEENT: NC/AT. Moist mucous membranes.  Eyes: VA 20/20 bilaterally conjunctiva pink, sclera white bilaterally. EOMI.  Neck:. Soft and supple. Full ROM without pain.  Cardiac:  +S1/S2. No murmurs. Peripheral pulses 2+ and symmetric. No LE edema.  Resp: Speaking in full sentences. No evidence of respiratory distress. No wheezes, rales or rhonchi.  Abd: Soft, non-tender, non-distended. Normal bowel sounds in all 4 quadrants. No guarding or rebound.  Back: Spine midline and non-tender. No CVAT.  Skin: No rashes, abrasions or lacerations.  Lymph: No cervical lymphadenopathy.  Neuro: Awake, alert & oriented x 3. CN II-XII intact, finger to nose intact, neurovasculary intact, muscle strength 5/5 x 4 extremities, gait without ataxia, negative romberg and prontor drift

## 2020-02-04 NOTE — ED ADULT NURSE NOTE - OBJECTIVE STATEMENT
patient A&Ox4, c/o nausea and dizziness x 1 week. no apparent distress noted at this time, son at bedside. resp even/unlabored. denies dizziness at this time.

## 2020-02-04 NOTE — ED PROVIDER NOTE - OBJECTIVE STATEMENT
pt is a 45 y/o female presenting to the ed with dizziness for the past week. pt reports dizziness is near syncope symptoms. pt with no headache nor visual changes. pt with no recent injuries or trauma. pt denies neck pain, abd pain, cp, SOB, recent surgeries or travel, hormonal use, hx of blood clots, back pain, numbness or loss of sensation, back pain, urinary or fecal incontinence. pt denies cardiac hx

## 2020-02-04 NOTE — ED PROVIDER NOTE - ATTENDING CONTRIBUTION TO CARE
43 yo F presents to ED c/o dizziness with near syncope.  No assoc CP, SOB, diaphoresis, N/V or syncope.  No focal weakness.  On exam awake and alert, NAD, PERRL, EOMI, no nystagmus, throat clear, mm moist, Neck supple, Cor Reg, Lung clear b/l, abd soft. NT, Ext FROM, Neuro non-focal, CN 2-12 intact, ambulatory with steady gait.  EKG NSR.  Will check labs, Rx Meclizine and re-eval

## 2020-03-09 NOTE — ED STATDOCS - NS_ATTENDINGSCRIBE_ED_ALL_ED
How Severe Are Your Spot(S)?: moderate Have Your Spot(S) Been Treated In The Past?: has not been treated Hpi Title: Evaluation of Skin Lesions I personally performed the service described in the documentation recorded by the scribe in my presence, and it accurately and completely records my words and actions.

## 2020-03-30 ENCOUNTER — EMERGENCY (EMERGENCY)
Facility: HOSPITAL | Age: 45
LOS: 1 days | Discharge: HOME | End: 2020-03-30
Attending: EMERGENCY MEDICINE
Payer: COMMERCIAL

## 2020-03-30 VITALS
DIASTOLIC BLOOD PRESSURE: 60 MMHG | RESPIRATION RATE: 18 BRPM | HEART RATE: 87 BPM | OXYGEN SATURATION: 97 % | WEIGHT: 143.08 LBS | TEMPERATURE: 99 F | SYSTOLIC BLOOD PRESSURE: 90 MMHG | HEIGHT: 60 IN

## 2020-03-30 VITALS
RESPIRATION RATE: 18 BRPM | OXYGEN SATURATION: 100 % | TEMPERATURE: 98 F | SYSTOLIC BLOOD PRESSURE: 90 MMHG | HEART RATE: 79 BPM | DIASTOLIC BLOOD PRESSURE: 63 MMHG

## 2020-03-30 DIAGNOSIS — Z98.51 TUBAL LIGATION STATUS: Chronic | ICD-10-CM

## 2020-03-30 DIAGNOSIS — Z98.890 OTHER SPECIFIED POSTPROCEDURAL STATES: Chronic | ICD-10-CM

## 2020-03-30 LAB
ALBUMIN SERPL ELPH-MCNC: 3.9 G/DL — SIGNIFICANT CHANGE UP (ref 3.3–5.2)
ALP SERPL-CCNC: 90 U/L — SIGNIFICANT CHANGE UP (ref 40–120)
ALT FLD-CCNC: 26 U/L — SIGNIFICANT CHANGE UP
ANION GAP SERPL CALC-SCNC: 13 MMOL/L — SIGNIFICANT CHANGE UP (ref 5–17)
AST SERPL-CCNC: 42 U/L — HIGH
BASOPHILS # BLD AUTO: 0 K/UL — SIGNIFICANT CHANGE UP (ref 0–0.2)
BASOPHILS NFR BLD AUTO: 0 % — SIGNIFICANT CHANGE UP (ref 0–2)
BILIRUB SERPL-MCNC: 0.2 MG/DL — LOW (ref 0.4–2)
BUN SERPL-MCNC: 8 MG/DL — SIGNIFICANT CHANGE UP (ref 8–20)
CALCIUM SERPL-MCNC: 8.8 MG/DL — SIGNIFICANT CHANGE UP (ref 8.6–10.2)
CHLORIDE SERPL-SCNC: 102 MMOL/L — SIGNIFICANT CHANGE UP (ref 98–107)
CO2 SERPL-SCNC: 25 MMOL/L — SIGNIFICANT CHANGE UP (ref 22–29)
CREAT SERPL-MCNC: 0.53 MG/DL — SIGNIFICANT CHANGE UP (ref 0.5–1.3)
EOSINOPHIL # BLD AUTO: 0 K/UL — SIGNIFICANT CHANGE UP (ref 0–0.5)
EOSINOPHIL NFR BLD AUTO: 0 % — SIGNIFICANT CHANGE UP (ref 0–6)
GLUCOSE SERPL-MCNC: 92 MG/DL — SIGNIFICANT CHANGE UP (ref 70–99)
HCG SERPL-ACNC: <4 MIU/ML — SIGNIFICANT CHANGE UP
HCT VFR BLD CALC: 34.5 % — SIGNIFICANT CHANGE UP (ref 34.5–45)
HGB BLD-MCNC: 11.4 G/DL — LOW (ref 11.5–15.5)
IMM GRANULOCYTES NFR BLD AUTO: 0 % — SIGNIFICANT CHANGE UP (ref 0–1.5)
LIDOCAIN IGE QN: 36 U/L — SIGNIFICANT CHANGE UP (ref 22–51)
LYMPHOCYTES # BLD AUTO: 1.17 K/UL — SIGNIFICANT CHANGE UP (ref 1–3.3)
LYMPHOCYTES # BLD AUTO: 40.3 % — SIGNIFICANT CHANGE UP (ref 13–44)
MCHC RBC-ENTMCNC: 30.8 PG — SIGNIFICANT CHANGE UP (ref 27–34)
MCHC RBC-ENTMCNC: 33 GM/DL — SIGNIFICANT CHANGE UP (ref 32–36)
MCV RBC AUTO: 93.2 FL — SIGNIFICANT CHANGE UP (ref 80–100)
MONOCYTES # BLD AUTO: 0.25 K/UL — SIGNIFICANT CHANGE UP (ref 0–0.9)
MONOCYTES NFR BLD AUTO: 8.6 % — SIGNIFICANT CHANGE UP (ref 2–14)
NEUTROPHILS # BLD AUTO: 1.48 K/UL — LOW (ref 1.8–7.4)
NEUTROPHILS NFR BLD AUTO: 51.1 % — SIGNIFICANT CHANGE UP (ref 43–77)
PLATELET # BLD AUTO: 139 K/UL — LOW (ref 150–400)
POTASSIUM SERPL-MCNC: 4.4 MMOL/L — SIGNIFICANT CHANGE UP (ref 3.5–5.3)
POTASSIUM SERPL-SCNC: 4.4 MMOL/L — SIGNIFICANT CHANGE UP (ref 3.5–5.3)
PROT SERPL-MCNC: 6.9 G/DL — SIGNIFICANT CHANGE UP (ref 6.6–8.7)
RBC # BLD: 3.7 M/UL — LOW (ref 3.8–5.2)
RBC # FLD: 12.5 % — SIGNIFICANT CHANGE UP (ref 10.3–14.5)
SODIUM SERPL-SCNC: 140 MMOL/L — SIGNIFICANT CHANGE UP (ref 135–145)
WBC # BLD: 2.9 K/UL — LOW (ref 3.8–10.5)
WBC # FLD AUTO: 2.9 K/UL — LOW (ref 3.8–10.5)

## 2020-03-30 PROCEDURE — 85027 COMPLETE CBC AUTOMATED: CPT

## 2020-03-30 PROCEDURE — 99284 EMERGENCY DEPT VISIT MOD MDM: CPT | Mod: 25

## 2020-03-30 PROCEDURE — 99285 EMERGENCY DEPT VISIT HI MDM: CPT

## 2020-03-30 PROCEDURE — 84702 CHORIONIC GONADOTROPIN TEST: CPT

## 2020-03-30 PROCEDURE — T1013: CPT

## 2020-03-30 PROCEDURE — 96375 TX/PRO/DX INJ NEW DRUG ADDON: CPT

## 2020-03-30 PROCEDURE — 36415 COLL VENOUS BLD VENIPUNCTURE: CPT

## 2020-03-30 PROCEDURE — 76705 ECHO EXAM OF ABDOMEN: CPT

## 2020-03-30 PROCEDURE — 80053 COMPREHEN METABOLIC PANEL: CPT

## 2020-03-30 PROCEDURE — 96374 THER/PROPH/DIAG INJ IV PUSH: CPT

## 2020-03-30 PROCEDURE — 76705 ECHO EXAM OF ABDOMEN: CPT | Mod: 26

## 2020-03-30 PROCEDURE — 83690 ASSAY OF LIPASE: CPT

## 2020-03-30 RX ORDER — ONDANSETRON 8 MG/1
1 TABLET, FILM COATED ORAL
Qty: 12 | Refills: 0
Start: 2020-03-30 | End: 2020-04-02

## 2020-03-30 RX ORDER — FAMOTIDINE 10 MG/ML
1 INJECTION INTRAVENOUS
Qty: 14 | Refills: 0
Start: 2020-03-30 | End: 2020-04-12

## 2020-03-30 RX ORDER — ONDANSETRON 8 MG/1
4 TABLET, FILM COATED ORAL ONCE
Refills: 0 | Status: COMPLETED | OUTPATIENT
Start: 2020-03-30 | End: 2020-03-30

## 2020-03-30 RX ORDER — FAMOTIDINE 10 MG/ML
20 INJECTION INTRAVENOUS ONCE
Refills: 0 | Status: COMPLETED | OUTPATIENT
Start: 2020-03-30 | End: 2020-03-30

## 2020-03-30 RX ORDER — SUCRALFATE 1 G
1 TABLET ORAL ONCE
Refills: 0 | Status: COMPLETED | OUTPATIENT
Start: 2020-03-30 | End: 2020-03-30

## 2020-03-30 RX ORDER — SODIUM CHLORIDE 9 MG/ML
1000 INJECTION INTRAMUSCULAR; INTRAVENOUS; SUBCUTANEOUS ONCE
Refills: 0 | Status: COMPLETED | OUTPATIENT
Start: 2020-03-30 | End: 2020-03-30

## 2020-03-30 RX ADMIN — SODIUM CHLORIDE 1000 MILLILITER(S): 9 INJECTION INTRAMUSCULAR; INTRAVENOUS; SUBCUTANEOUS at 14:47

## 2020-03-30 RX ADMIN — Medication 1 GRAM(S): at 14:57

## 2020-03-30 RX ADMIN — FAMOTIDINE 20 MILLIGRAM(S): 10 INJECTION INTRAVENOUS at 14:52

## 2020-03-30 RX ADMIN — ONDANSETRON 4 MILLIGRAM(S): 8 TABLET, FILM COATED ORAL at 14:52

## 2020-03-30 NOTE — ED PROVIDER NOTE - PATIENT PORTAL LINK FT
You can access the FollowMyHealth Patient Portal offered by [facility] by registering at the following website: [email or web address]. By joining [employer]’s FollowMyHealth portal, you will also be able to view your health information using other applications (apps) compatible with our system.

## 2020-03-30 NOTE — ED PROVIDER NOTE - OBJECTIVE STATEMENT
43 yo F Pt, PmHx: hernia repair, presents to ED complaining of abdominal pain x 4 days. PT states she has epigastric abdominal pain, with associated anorexia. Pt denies vomiting, diarrhea, fever, chills, SOB, cough, and any other acute symptoms at this time.

## 2020-03-30 NOTE — ED PROVIDER NOTE - ATTENDING CONTRIBUTION TO CARE
Viktor: I performed a face to face bedside interview with patient regarding history of present illness, review of symptoms and past medical history. I completed an independent physical exam.  I have discussed patient's plan of care with advanced care provider.   I agree with note as stated above including HISTORY OF PRESENT ILLNESS, HIV, PAST MEDICAL/SURGICAL/FAMILY/SOCIAL HISTORY, ALLERGIES AND HOME MEDICATIONS, REVIEW OF SYSTEMS, PHYSICAL EXAM, MEDICAL DECISION MAKING and any PROGRESS NOTES during the time I functioned as the attending physician for this patient  unless otherwise noted. My brief assessment is as follows: 44F p/w epigastric pain x 4 days. Decreased appetite. Denies vomiting, diarrhea, fever, SOB.   Gen: Well appearing in NAD  Head: NC/AT  Neck: trachea midline  Resp:  No distress  GI: mild epigastric ttp, negative murphys.   Ext: no deformities  Neuro:  A&O appears non focal  Skin:  Warm and dry as visualized  Psych:  Normal affect and mood   US WNL. Labs WNL. pain improved with GI cocktail. Ready for dc with GI meds and GI f/u. Return precautions given.

## 2020-03-30 NOTE — ED PROVIDER NOTE - CARE PROVIDER_API CALL
Chuck Fong)  Hematology  [address], [address]  [place] [address]  Phone: [phone]  Fax: [phone]  Follow Up Time:     Parul Swanson (DO)  Gastroenterology  [address], [address]  [place] [address]  Phone: [phone]  Fax: [phone]  Follow Up Time:

## 2020-03-30 NOTE — ED PROVIDER NOTE - PROGRESS NOTE DETAILS
Pt lab results reviewed with Pt. PT vitals will be rechecked prior to dc. PT states pain greatly improved with medicaiton. rx ximilar meds. hem and gi f/u given

## 2020-03-30 NOTE — ED PROVIDER NOTE - CLINICAL SUMMARY MEDICAL DECISION MAKING FREE TEXT BOX
epigastric abdominal pain, possible gastritis vs pancreatitis vs gallbladder pathology, will medicate, obtain labs and US, and re-evaluate

## 2020-04-05 ENCOUNTER — EMERGENCY (EMERGENCY)
Facility: HOSPITAL | Age: 45
LOS: 1 days | Discharge: HOME | End: 2020-04-05
Attending: EMERGENCY MEDICINE
Payer: COMMERCIAL

## 2020-04-05 VITALS
TEMPERATURE: 99 F | DIASTOLIC BLOOD PRESSURE: 53 MMHG | SYSTOLIC BLOOD PRESSURE: 96 MMHG | WEIGHT: 143.08 LBS | HEIGHT: 60 IN | RESPIRATION RATE: 20 BRPM | HEART RATE: 99 BPM

## 2020-04-05 DIAGNOSIS — Z98.51 TUBAL LIGATION STATUS: Chronic | ICD-10-CM

## 2020-04-05 DIAGNOSIS — Z98.890 OTHER SPECIFIED POSTPROCEDURAL STATES: Chronic | ICD-10-CM

## 2020-04-05 LAB
APPEARANCE UR: CLEAR — SIGNIFICANT CHANGE UP
BILIRUB UR-MCNC: NEGATIVE — SIGNIFICANT CHANGE UP
COLOR SPEC: YELLOW — SIGNIFICANT CHANGE UP
DIFF PNL FLD: ABNORMAL
EPI CELLS # UR: SIGNIFICANT CHANGE UP
GLUCOSE UR QL: NEGATIVE MG/DL — SIGNIFICANT CHANGE UP
HCG UR QL: NEGATIVE — SIGNIFICANT CHANGE UP
KETONES UR-MCNC: NEGATIVE — SIGNIFICANT CHANGE UP
LEUKOCYTE ESTERASE UR-ACNC: ABNORMAL
NITRITE UR-MCNC: NEGATIVE — SIGNIFICANT CHANGE UP
PH UR: 7 — SIGNIFICANT CHANGE UP (ref 5–8)
PROT UR-MCNC: 15 MG/DL
RBC CASTS # UR COMP ASSIST: ABNORMAL /HPF (ref 0–4)
SP GR SPEC: 1 — LOW (ref 1.01–1.02)
UROBILINOGEN FLD QL: NEGATIVE MG/DL — SIGNIFICANT CHANGE UP
WBC UR QL: ABNORMAL

## 2020-04-05 PROCEDURE — 81025 URINE PREGNANCY TEST: CPT

## 2020-04-05 PROCEDURE — 81001 URINALYSIS AUTO W/SCOPE: CPT

## 2020-04-05 PROCEDURE — 99284 EMERGENCY DEPT VISIT MOD MDM: CPT

## 2020-04-05 PROCEDURE — 99283 EMERGENCY DEPT VISIT LOW MDM: CPT

## 2020-04-05 PROCEDURE — T1013: CPT

## 2020-04-05 PROCEDURE — 87086 URINE CULTURE/COLONY COUNT: CPT

## 2020-04-05 RX ORDER — PHENAZOPYRIDINE HCL 100 MG
1 TABLET ORAL
Qty: 6 | Refills: 0
Start: 2020-04-05 | End: 2020-04-06

## 2020-04-05 RX ORDER — IBUPROFEN 200 MG
1 TABLET ORAL
Qty: 28 | Refills: 0
Start: 2020-04-05 | End: 2020-04-11

## 2020-04-05 RX ORDER — CEPHALEXIN 500 MG
1 CAPSULE ORAL
Qty: 28 | Refills: 0
Start: 2020-04-05 | End: 2020-04-11

## 2020-04-05 RX ORDER — POLYETHYLENE GLYCOL 3350 17 G/17G
17 POWDER, FOR SOLUTION ORAL
Qty: 55 | Refills: 0
Start: 2020-04-05 | End: 2020-04-07

## 2020-04-05 RX ORDER — PYRIDOXINE HCL (VITAMIN B6) 100 MG
200 TABLET ORAL ONCE
Refills: 0 | Status: DISCONTINUED | OUTPATIENT
Start: 2020-04-05 | End: 2020-04-05

## 2020-04-05 RX ORDER — ONDANSETRON 8 MG/1
4 TABLET, FILM COATED ORAL ONCE
Refills: 0 | Status: COMPLETED | OUTPATIENT
Start: 2020-04-05 | End: 2020-04-05

## 2020-04-05 RX ORDER — PHENAZOPYRIDINE HCL 100 MG
200 TABLET ORAL ONCE
Refills: 0 | Status: COMPLETED | OUTPATIENT
Start: 2020-04-05 | End: 2020-04-05

## 2020-04-05 RX ORDER — PHENAZOPYRIDINE HCL 100 MG
100 TABLET ORAL ONCE
Refills: 0 | Status: DISCONTINUED | OUTPATIENT
Start: 2020-04-05 | End: 2020-04-05

## 2020-04-05 RX ADMIN — ONDANSETRON 4 MILLIGRAM(S): 8 TABLET, FILM COATED ORAL at 22:07

## 2020-04-05 RX ADMIN — Medication 200 MILLIGRAM(S): at 22:07

## 2020-04-05 NOTE — ED PROVIDER NOTE - PATIENT PORTAL LINK FT
You can access the FollowMyHealth Patient Portal offered by [facility] by registering at the following website: [email or web address]. By joining [employer]’s FollowMyHealth portal, you will also be able to view your health information using other applications (apps) compatible with our system.

## 2020-04-05 NOTE — ED PROVIDER NOTE - CARE PROVIDER_API CALL
Jin Young)  Urology  [address], [address]  [place] [address]  Phone: [phone]  Fax: [phone]  Follow Up Time:

## 2020-04-05 NOTE — ED PROVIDER NOTE - PHYSICAL EXAMINATION
General: In NAD, non-toxic appearing; well nourished/developed.  Skin: No rashes or lesions. Warm, dry, color normal for race.   Cardio: No lifts, heaves, visible pulsations. No thrills. Rate and rhythm regular, S1 & S2 clear. No audible murmur, gallop, or rub.  Resp: Normal AP to lateral diameter, symmetrical excursion b/l. Breath sounds vesicular, symmetrical and without rales, rhonchi or wheezing b/l.  Abd: Non-distended. Soft, non-tender, no masses palpated. No rebound, guarding. No CVA tenderness.  Neuro: A&Ox3. CN II-XII grossly intact.

## 2020-04-05 NOTE — ED PROVIDER NOTE - ATTENDING CONTRIBUTION TO CARE
I personally saw the patient with the PA, and completed the key components of the history and physical exam. I then discussed the management plan with the PA.   gen in nad resp clar cardiac no murmur abd soft neuro intact   agree with pa plan of care

## 2020-04-05 NOTE — ED PROVIDER NOTE - CLINICAL SUMMARY MEDICAL DECISION MAKING FREE TEXT BOX
45 yo female presents to ED c/o dysuria. Patient states similar sxms one week ago, prescribed ?Keflex by [facility], but did not complete course because she felt better. Patient educated on proper use of antibiotics, such as completing course despite being sxm free. 45 yo female presents to ED c/o intermittent dysuria x1 week. Patient states similar sxms one week ago, prescribed ?Keflex by [facility], but did not complete course because she felt better, then sxms returned. Patient educated on proper use of antibiotics, such as completing course despite being sxm free. Patient is non-toxic well appearing.   Plan:  - UA  - Meds  - [facility]/urology follow up

## 2020-04-05 NOTE — ED PROVIDER NOTE - OBJECTIVE STATEMENT
45 yo female presents to ED c/o dysuria. States one week ago with similar sxms, prescribed a green pill which patient has not been complaint with because she felt better. No with similar sxms as previous. No further complaints at this time.   Denies fever, abdominal pain. 45 yo female presents to ED c/o dysuria x1 week. States one week ago with similar sxms, prescribed a green pill which patient has not been complaint with because she felt better. No with similar sxms as previous. No further complaints at this time.   Denies fever, abdominal pain.  Jasbir. 43 yo female presents to ED c/o dysuria x1 week. States one week ago with similar sxms, prescribed a green pill which patient has not been complaint with because she felt better. No with similar sxms as previous. No further complaints at this time.   Denies fever, abdominal pain.

## 2020-04-06 LAB
CULTURE RESULTS: SIGNIFICANT CHANGE UP
SPECIMEN SOURCE: SIGNIFICANT CHANGE UP

## 2020-04-07 ENCOUNTER — EMERGENCY (EMERGENCY)
Facility: HOSPITAL | Age: 45
LOS: 1 days | Discharge: HOME | End: 2020-04-07
Attending: EMERGENCY MEDICINE
Payer: COMMERCIAL

## 2020-04-07 VITALS
SYSTOLIC BLOOD PRESSURE: 96 MMHG | WEIGHT: 138.01 LBS | OXYGEN SATURATION: 97 % | HEART RATE: 65 BPM | DIASTOLIC BLOOD PRESSURE: 66 MMHG | HEIGHT: 60 IN | TEMPERATURE: 101 F | RESPIRATION RATE: 18 BRPM

## 2020-04-07 VITALS — HEART RATE: 67 BPM | OXYGEN SATURATION: 98 % | RESPIRATION RATE: 18 BRPM

## 2020-04-07 DIAGNOSIS — Z98.890 OTHER SPECIFIED POSTPROCEDURAL STATES: Chronic | ICD-10-CM

## 2020-04-07 DIAGNOSIS — Z98.51 TUBAL LIGATION STATUS: Chronic | ICD-10-CM

## 2020-04-07 LAB
ALBUMIN SERPL ELPH-MCNC: 3.9 G/DL — SIGNIFICANT CHANGE UP (ref 3.3–5.2)
ALP SERPL-CCNC: 100 U/L — SIGNIFICANT CHANGE UP (ref 40–120)
ALT FLD-CCNC: 20 U/L — SIGNIFICANT CHANGE UP
ANION GAP SERPL CALC-SCNC: 13 MMOL/L — SIGNIFICANT CHANGE UP (ref 5–17)
APTT BLD: 32.6 SEC — SIGNIFICANT CHANGE UP (ref 27.5–36.3)
AST SERPL-CCNC: 19 U/L — SIGNIFICANT CHANGE UP
BILIRUB SERPL-MCNC: 0.3 MG/DL — LOW (ref 0.4–2)
BUN SERPL-MCNC: 7 MG/DL — LOW (ref 8–20)
CALCIUM SERPL-MCNC: 9.4 MG/DL — SIGNIFICANT CHANGE UP (ref 8.6–10.2)
CHLORIDE SERPL-SCNC: 104 MMOL/L — SIGNIFICANT CHANGE UP (ref 98–107)
CO2 SERPL-SCNC: 25 MMOL/L — SIGNIFICANT CHANGE UP (ref 22–29)
CREAT SERPL-MCNC: 0.51 MG/DL — SIGNIFICANT CHANGE UP (ref 0.5–1.3)
D DIMER BLD IA.RAPID-MCNC: 372 NG/ML DDU — HIGH
GLUCOSE SERPL-MCNC: 82 MG/DL — SIGNIFICANT CHANGE UP (ref 70–99)
HCG SERPL-ACNC: <4 MIU/ML — SIGNIFICANT CHANGE UP
HCT VFR BLD CALC: 33.1 % — LOW (ref 34.5–45)
HGB BLD-MCNC: 10.7 G/DL — LOW (ref 11.5–15.5)
INR BLD: 1.06 RATIO — SIGNIFICANT CHANGE UP (ref 0.88–1.16)
MCHC RBC-ENTMCNC: 30.3 PG — SIGNIFICANT CHANGE UP (ref 27–34)
MCHC RBC-ENTMCNC: 32.3 GM/DL — SIGNIFICANT CHANGE UP (ref 32–36)
MCV RBC AUTO: 93.8 FL — SIGNIFICANT CHANGE UP (ref 80–100)
PLATELET # BLD AUTO: 418 K/UL — HIGH (ref 150–400)
POTASSIUM SERPL-MCNC: 4.7 MMOL/L — SIGNIFICANT CHANGE UP (ref 3.5–5.3)
POTASSIUM SERPL-SCNC: 4.7 MMOL/L — SIGNIFICANT CHANGE UP (ref 3.5–5.3)
PROT SERPL-MCNC: 7.3 G/DL — SIGNIFICANT CHANGE UP (ref 6.6–8.7)
PROTHROM AB SERPL-ACNC: 12 SEC — SIGNIFICANT CHANGE UP (ref 10–12.9)
RBC # BLD: 3.53 M/UL — LOW (ref 3.8–5.2)
RBC # FLD: 12 % — SIGNIFICANT CHANGE UP (ref 10.3–14.5)
SODIUM SERPL-SCNC: 142 MMOL/L — SIGNIFICANT CHANGE UP (ref 135–145)
TROPONIN T SERPL-MCNC: <0.01 NG/ML — SIGNIFICANT CHANGE UP (ref 0–0.06)
WBC # BLD: 4.2 K/UL — SIGNIFICANT CHANGE UP (ref 3.8–10.5)
WBC # FLD AUTO: 4.2 K/UL — SIGNIFICANT CHANGE UP (ref 3.8–10.5)

## 2020-04-07 PROCEDURE — 99285 EMERGENCY DEPT VISIT HI MDM: CPT

## 2020-04-07 PROCEDURE — 71045 X-RAY EXAM CHEST 1 VIEW: CPT | Mod: 26

## 2020-04-07 PROCEDURE — 71275 CT ANGIOGRAPHY CHEST: CPT | Mod: 26

## 2020-04-07 PROCEDURE — 93010 ELECTROCARDIOGRAM REPORT: CPT

## 2020-04-07 RX ORDER — ACETAMINOPHEN 500 MG
975 TABLET ORAL ONCE
Refills: 0 | Status: COMPLETED | OUTPATIENT
Start: 2020-04-07 | End: 2020-04-07

## 2020-04-07 RX ADMIN — Medication 975 MILLIGRAM(S): at 18:30

## 2020-04-07 NOTE — ED PROVIDER NOTE - NS ED ROS FT
Constitutional: (-) fever  (-)chills  (-)sweats  Eyes/ENT: (-) blurry vision, (-) epistaxis  (-)rhinorrhea   (-) sore throat    Cardiovascular: (-) chest pain, (-) palpitations (-) edema   Respiratory: (+) cough, (-) shortness of breath   Gastrointestinal: (+)nausea  (-)vomiting, (-) diarrhea  (-) abdominal pain   :  (-)dysuria, (-)frequency, (-)urgency, (-)hematuria  Musculoskeletal: (-) neck pain, (-) back pain, (-) joint pain  Integumentary: (-) rash, (-) edema  Neurological: (-) headache, (-) altered mental status  (-)LOC

## 2020-04-07 NOTE — ED ADULT NURSE REASSESSMENT NOTE - NS ED NURSE REASSESS COMMENT FT1
Patient  received from dayshift RN. Assumed patient care from RN. Patient appears age appropriate. well nourished. normal affect, pleasant mood in  no distress. Patient resting on stretcher. Pt AxO4, VSS, respirations CTA BL, no wheeze/stridor/Rhonchi/Crackles. Not on supplemental O2. Able to speak in full sentences without distress. Cardio NSR on cardiac monitor , S1S2, Regular, rate and Rhythm. ABD- soft/non-tender w/ light palpation/ Non distended, normal BSx4 quadrants. No guarding/ rebound tenderness. Skin c/d/I. IV place at  insertion site  20 gauge noted at LAC, flushing without difficulty. Safety measures taken, bed in low position, call bell within reach, side rails up x2. Plan of care explained. Pt verbalized understanding. Will continue to monitor.

## 2020-04-07 NOTE — ED ADULT TRIAGE NOTE - CHIEF COMPLAINT QUOTE
patient c/o chest pain and back pain for 4-5 days, called PMD and was told to come here, pain worse with inspiration. denies SOB, cough, fever.

## 2020-04-07 NOTE — ED PROVIDER NOTE - OBJECTIVE STATEMENT
44yoF; with pmh signif for recent UTI; now p/w chest pain--sscp, radiating to back, burning sensation, associated with cough. denies sob. reports being here 1 week ago for UTI and was placed on abx. states in past day, c/o nausea with abx ingestion. denies f/c/s. denies travel. denies sick contacts.  PMH: denies  SOCIAL: No tobacco/illicit substance use/socialEtOH

## 2020-04-07 NOTE — ED PROVIDER NOTE - CLINICAL SUMMARY MEDICAL DECISION MAKING FREE TEXT BOX
patient with chest pain, with cough. likely covid, will screen and recommend isolation. cta done to r/o PE given pleuritic nature of pain. cta negative for pe. will dc with instructions to return for worsening symptoms.

## 2020-04-07 NOTE — ED PROVIDER NOTE - PATIENT PORTAL LINK FT
You can access the FollowMyHealth Patient Portal offered by [facility] by registering at the following website: [email or web address]. By joining [employer]’s FollowMyHealth portal, you will also be able to view your health information using other applications (apps) compatible with our system.

## 2020-04-07 NOTE — ED PROVIDER NOTE - NSFOLLOWUPINSTRUCTIONS_ED_ALL_ED_FT
COVID-19 (Enfermedad por coronavirus 2019)    LO QUE NECESITA SABER:    COVID-19 es la enfermedad causada por el nuevo coronavirus del 2019. Se encontró por primera vez en individuos en teddy parte de [place] a finales de 2019. El virus se está propagando a otros países a medida que las personas infectadas viajan. Los coronavirus generalmente causan infecciones respiratorias (nariz, garganta y pulmones), rosalind un resfriado. También pueden causar infecciones graves, rosalind el síndrome respiratorio del [place] (MERS) y el síndrome respiratorio faith severo (SARS). El nuevo virus está relacionado con el coronavirus del SARS, por lo que oficialmente se denomina coronavirus del SARS 2 (SARS-CoV-2).    INSTRUCCIONES SOBRE EL NELLI HOSPITALARIA:    Si hanna que usted o alguien que conoce puede estar infectado:Es importante que cualquier persona que pueda estar infectada se willem la prueba de inmediato. Willem lo siguiente para proteger a otras personas:     Si se requiere atención de emergencia,avise al operador de la posible infección, o llame antes y avise al servicio de urgencias.      Llame a un médicopara verlo en el consultorio. Cualquier persona que pueda estar infectada no debe llegar sin llamar meenakshi. El médico deberá proteger a los miembros del personal y a otros pacientes.      La persona que puede estar infectada debe usar teddy mascarilla médicaantes de recibir atención médica. La mascarilla debe permanecer puesta hasta que el médico le indique que se la quite.    Llame al número local de emergencias (911 en los [place]) o pídale a alguien que lo lleve al departamento de emergencias si:Tiene signos o síntomas de COVID-19, y cualquiera de los siguientes es cierto:     Viajó en los últimos 14 días a teddy smitha donde el virus está activo o tuvo un contacto cercano con alguien que lo hizo.      Usted tuvo contacto cercano en los últimos 14 días con alguien que tiene teddy infección confirmada.    Llame a ortiz médico si:No tiene signos o síntomas de COVID-19, abimael cualquiera de los siguientes es cierto:     Viajó en los últimos 14 días a teddy smitha donde el virus está activo o tuvo un contacto cercano con alguien que lo hizo.      Usted tuvo contacto cercano en los últimos 14 días con alguien que tiene teddy infección confirmada.      Usted tiene preguntas o inquietudes acerca de ortiz condición o cuidado.    Medicamentos:Es posible que necesite alguno de los siguientes si los síntomas son leves:     Los descongestionantesayudan a reducir la congestión nasal y ayudan a respirar más fácilmente. Si lino pastillas descongestionantes, pueden causarle agitación o problemas para dormir. No use aerosol descongestionante por más de unos cuantos días.      Los jarabes para la tosayudan a reducir la tos. Pregúntele a ortiz médico cuál tipo de medicamento para la tos es mejor para usted.      Acetaminofénalivia el dolor y baja la fiebre. Está disponible sin receta médica. Pregunte la cantidad y la frecuencia con que debe tomarlos. Siga las indicaciones. Hugo las etiquetas de todos los demás medicamentos que esté usando para saber si también contienen acetaminofén, o pregunte a ortiz médico o farmacéutico. El acetaminofén puede causar daño en el hígado cuando no se lino de forma correcta. No use más de 4 gramos (4000 miligramos) en total de acetaminofeno en un día.      Los ZAKI,rosalind el ibuprofeno, ayudan a disminuir la inflamación, el dolor y la fiebre. Los ZAKI pueden causar sangrado estomacal o problemas renales en ciertas personas. Si usted lino un medicamento anticoagulante, siempre pregúntele a ortiz médico si los ZAKI son seguros para usted. Siempre hugo la etiqueta de chad medicamento y siga las instrucciones.      [place] karie medicamentos rosalind se le haya indicado.Consulte con ortiz médico si usted hanna que ortiz medicamento no le está ayudando o si presenta efectos secundarios. Infórmele si es alérgico a cualquier medicamento. Mantenga teddy lista actualizada de los medicamentos, las vitaminas y los productos herbales que lino. Incluya los siguientes datos de los medicamentos: cantidad, frecuencia y motivo de administración. Traiga con usted la lista o los envases de las píldoras a karie citas de seguimiento. Lleve la lista de los medicamentos con usted en andreia de teddy emergencia.    Cómo se propaga el coronavirus 2019:El virus parece propagarse rápida y fácilmente. A continuación se indican las formas en que se hanna que se propaga el virus, abimael es posible que surja más información:     Las gotitas son la forma más común de propagación de todos los coronavirus.El virus puede viajar en gotitas que se everardo cuando teddy persona habla, tose o estornuda. Cualquiera que respire el virus o se lo meta en los ojos puede infectarse.      Teddy persona infectada puede ser capaz de dejar el virus en objetos y superficies.Otra persona puede contraer el virus en karie lorenzo al tocar el objeto o la superficie. La infección se produce si la persona se toca los ojos o la boca sin antes lavarse las lorenzo. Aún no se sabe cuánto tiempo puede permanecer el virus en un objeto o superficie. Según la evidencia, puede durar hasta 9 días a temperatura ambiente.      El contacto de persona a persona puede propagar el virus.Por ejemplo, teddy persona infectada puede propagar el virus al darle la mano a alguien. En chad momento, no parece que el virus pueda transmitirse a un bebé enma el embarazo o el parto. El virus tampoco parece propagarse enma la lactancia. Si está embarazada o amamantando, hable con ortiz médico u obstetra sobre cualquier preocupación que tenga.      Un animal infectado puede ser capaz de infectar a teddy persona que lo toque.[place] puede ocurrir en mercados vivos o en teddy chace.    Prevenga la infección por coronavirus 2019:Todas las personas deberían hacer lo siguiente para evitar contraer o propagar el virus:     Lávese las lorenzo frecuentemente.Use agua y jabón cada vez que se lave las lorenzo. Frótese las lorenzo enjabonadas, entrelazando los dedos. Use los dedos de teddy mano para restregar debajo de las uñas de la otra mano. Lávese enma al menos 20 segundos. Enjuague con agua corriente caliente enma varios segundos. Luego séquese las lorenzo. Use gel antibacterial si no tiene agua y jabón a ortiz disponibilidad. No se toque los ojos o la boca sin antes lavarse las lorenzo. Lavado de lorenzo           Cúbrase al toser o estornudar.Use un pañuelo que cubra la boca y la nariz. Arroje el pañuelo a la basura de inmediato. Use el ángulo del brazo si no tiene un pañuelo disponible. Lávese las lorenzo con agua y jabón o use un desinfectante de lorenzo. No se pare cerca de nadie que esté estornudando o tosiendo.      Tenga cuidado al estar con otras personas.La mejor manera de prevenir la infección es evitar a cualquiera que esté infectado, abimael esto puede ser difícil. Teddy persona infectada puede ser capaz de propagar el virus antes de que aparezcan los signos o síntomas. Hasta que no se sepa más, jessica vez deba evitar darse la mano con otras personas. Si se da la mano, lávese las lorenzo o use un desinfectante de lorenzo lo antes posible. No es necesario que use teddy mascarilla médica si se encuentra caitlin y no está cuidando a teddy persona infectada.      Pregunte sobre las vacunas que pudiera necesitar.No hay ninguna vacuna disponible para el nuevo coronavirus. Abimael cualquier infección puede afectar ortiz sistema inmunitario. Un sistema inmunitario debilitado lo hace más vulnerable al nuevo coronavirus. Hasta que se desarrolle teddy vacuna contra el nuevo virus, willem lo siguiente:   Vacúnese contra la gripe tan pronto rosalind se recomiende cada año.La vacuna antigripal se ofrece a partir de septiembre u octubre. Los virus de la gripe cambian, por lo que es importante vacunarse contra la gripe cada año.      Hable con ortiz médico sobre ortiz historial de vacunación.Dígale si no recibió ciertas vacunas cuando era armando, o si no recibió todas las dosis recomendadas. Dígale si no conoce karie antecedentes de vacunas. Ortiz médico le indicará qué vacunas necesita y cuándo recibirlas.           Si tiene COVID-19:Los médicos le darán instrucciones específicas que debe seguir. Las siguientes son pautas generales para recordarle cómo mantener a los demás a magaly hasta que usted esté caitlin:     Limite el contacto cercano con otras personas.Ortiz médico le dirá cuándo podrá volver a estar con otras personas. [place] puede ser cuando no tenga fiebre, no tome medicamentos para la fiebre y no tenga síntomas. Los líquidos de karie vías respiratorias deberán mostrar un resultado negativo para el virus 2 veces con un intervalo de al menos 24 horas. Hasta entonces, willem lo siguiente junto con las instrucciones de ortiz médico:   Salga de ortiz casa solo para las citas médicas. Siempre llame meenakshi al consultorio del médico para que se prepare para mantener a los demás a magaly.      Manténgase al menos a 6 pies (2 metros) de los demás.      Duerma en teddy habitación separada de las demás de la casa.      No se dé la mano con otras personas.      Use teddy mascarilla médica cuando haya otras personas cerca de usted.[place] puede ayudar a evitar que las gotitas propaguen el virus cuando usted habla, estornuda o tose.      No comparta artículos.No comparta platos, toallas ni otros artículos con nadie. Los artículos deben ser lavados después de usarlos.      No manipule animales vivos.El virus puede propagarse a los animales, incluyendo a las mascotas. Hasta que se sepa más, es mejor no tocar, jugar o manipular animales vivos.    Cuidados personales:    [place] más líquidos rosalind se le indique.Los líquidos le ayudarán a aflojar y disolver la mucosidad para que la pueda expectorar. Los líquidos ayudarán a evitar la deshidratación. Los líquidos que ayudan a prevenir la deshidratación pueden ser agua, jugo de fruta y caldo. No tome líquidos que contienen cafeína. La cafeína puede aumentar el riesgo de deshidratación. Pregúntele a ortiz médico cuál es la cantidad de líquido que necesita ingerir a diario.      Alivie el dolor de garganta.Willem gárgaras de agua tibia con sal. [place]ama podría ayudar a aliviar el dolor de garganta. Prepare agua salina disolviendo ¼ de cucharada de sal a 1 taza de agua tibia. Usted puede también chupar dulces duros o pastillas para la garganta. Usted puede usar aerosol para el dolor de garganta.      Use un humidificador o vaporizador.Use un humidificador de tu frío o un vaporizador para elevar la humedad en ortiz casa. Onekama podría ayudarle a respirar más fácilmente y al mismo tiempo disminuir la tos.      Use gotas nasales de solución salina rosalind se le haya indicado.Estas ayudan a aliviar la congestión.      Aplique vaselina en la parte externa alrededor de las fosas nasales.Esta puede disminuir la irritación de sonarse la nariz.      No fume.La nicotina y otros químicos en los cigarrillos y cigarros pueden empeorar karie síntomas. También pueden causar infecciones rosalind la bronquitis o la neumonía. Pida información a ortiz médico si usted actualmente fuma y necesita ayuda para dejar de fumar. Los cigarrillos electrónicos o el tabaco sin humo igualmente contienen nicotina. Consulte con ortiz médico antes de utilizar estos productos.    Si cuida de alguien que tiene COVID-19:    Use teddy mascarilla médica cuando esté cerca de esta persona.[place] puede ayudar a protegerlo de las gotitas que transportan el virus cuando la persona habla, estornuda o tose.      No permita que otros se acerquen a la persona.Nadie debe ir a la casa de la persona a menos que sea necesario. Mantenga la vicki de la habitación cerrada a menos que necesite entrar o salir.      Asegúrese de que la habitación de la persona tenga un buen flujo de aire.Puede abrir la ventana si el clima lo permite. También se puede encender el aire acondicionado para ayudar a que el aire se mueva.      Limpie los artículos que la persona usa o toca.Use guantes desechables para david la ropa de la persona. Coloque la ropa sucia en teddy bolsa de plástico. Use [place] y jabón para david la ropa de la persona. Lave los utensilios para comer y otros artículos después de que la persona los use. Deseche los guantes después de usarlos.      Limpie las superficies con frecuencia.Use lejía diluida con agua o toallitas desinfectantes. Limpie los mostradores, los pomos de las tayler, los asientos de los inodoros y otras superficies.    Acuda a la consulta de control con ortiz médico según las indicaciones:Anote karie preguntas para que se acuerde de hacerlas enma karie visitas.    Para más información:    Centers for Disease Control and Prevention  [address] [place], [place] [id number]  Phone: [phone]  Phone: [phone]  Web Address: http://www.cdc.gov

## 2020-04-07 NOTE — ED ADULT NURSE NOTE - NS ED NURSE RECORD ANOTHER VITAL SIGN
Patient:   ADARSH ARGUETA            MRN: [id number]            FIN: [id number]              Age:   85 years     Sex:  MALE     :  32   Associated Diagnoses:   Acute epigastric pain   Author:   BANDAR DE LA CRUZ     Subjective   Chief complaint Abdominal pain .       Health Status   Allergies:    Allergies (1) Active Reaction  No Known Medication Allergies None Documented    Current medications:    Medications (4) Active  Scheduled: (2)  ciprofloxacin-dextrose 5%  400 mg 200 mL, IVPB, Q12H  LevETIRAcetam 500 mg tab  500 mg 1 tab, Oral, Q12H  Continuous: (1)  Dextrose 5% - 0.9% NaCl 1,000 mL  1,000 mL, IV, 85 mL/hr  PRN: (1)  HYDROmorphone 2 mg tab  0.5 mg 0.25 tab, Oral, Q4H      Review of Systems   Constitutional:  Weakness, Fatigue.    Eye:  Negative except as documented in history of present illness.   Ear/Nose/Mouth/Throat:       Decreased hearing: Bilaterally.    Cardiovascular:  Negative except as documented in history of present illness.   Respiratory:  Negative except as documented in history of present illness.   Gastrointestinal:       Abdominal pain: Epigastric area, The severity is moderate, Characterized as ( Sharp ).   Musculoskeletal:  Negative except as documented in history of present illness.   Neurologic:  Alert and oriented X4.    Endocrine:  Negative except as documented in history of present illness.   Allergy/Immunologic:  Negative except as documented in history of present illness.   Psychiatric:  Negative except as documented in history of present illness.   All other systems ROS reviewed as documented in chart.     Objective   Intake and Output   I&O 24 hr   I & O between:  28-OCT-2019 07:45 TO 29-OCT-2019 07:45  Med Dosing Weight:  77  kg   28-OCT-2019  24 Hour Intake:   742.78  ( 9.65 mL/kg )  24 Hour Output:   150.00           24 Hour Urine/Stool Output:   0.0  24 Hour Balance:   592.78           24 Hour Urine Output:   150.00  ( 0.08 mL/kg/hr )                   Urine  Count:  3.00       VS/Measurements   Vital Signs   10/28/19 22:27 CDT Temperature - VS 37.0 deg_C  Normal    Temperature Source - VS Tympanic    Heart/Pulse Rate 65  Normal    Pulse Source Monitor    Respiration Rate 16 breaths/min  Normal    SpO2 100 %  Normal    NIBP Systolic 118  Normal    NIBP Diastolic 57  LOW    NIBP Source Left Arm    NIBP MAP 77  Normal   10/28/19 17:22 CDT Temperature - VS 37.4 deg_C  Normal    Temperature Source - VS Tympanic    Heart/Pulse Rate 68  Normal    Pulse Source Monitor    Respiration Rate 16 breaths/min  Normal    SpO2 98 %  Normal    NIBP Systolic 141  HI    NIBP Diastolic 75  Normal    NIBP Source Left Arm    NIBP MAP 97  Normal   10/28/19 13:12 CDT Heart/Pulse Rate 62  Normal    Respiration Rate 18 breaths/min  HI    SpO2 100 %  Normal    NIBP Systolic 132  Normal    NIBP Diastolic 75  Normal    NIBP   Normal       General:  Alert and oriented, No acute distress.    Eye:  Pale .    Neck:  Non-tender, No carotid bruit, No jugular venous distention, No lymphadenopathy.   Respiratory:  Lungs are clear to auscultation, Respirations are non-labored, Breath sounds are equal, Symmetrical chest wall expansion.   Cardiovascular:  Normal rate, Regular rhythm, No murmur, No gallop.   Gastrointestinal:  Soft, Non-distended, Normal bowel sounds, No organomegaly, Epigastric tenderness.   Genitourinary:  No costovertebral angle tenderness.    Lymphatics:  No lymphadenopathy neck, axilla, groin.    Musculoskeletal:  Normal range of motion.    Integumentary:  Warm, Dry, Pink.    Neurologic:  Alert, Oriented.    Psychiatric:  Cooperative, Appropriate mood & affect.      Results Review   General results   Today's results   10/29/19 06:52 CDT WBC 10.9 THOUSAND/mcL    RBC 3.49 MILLION/mcL  LOW    Hemoglobin 10.7 gm/dL  LOW    Hematocrit 33.3 %  LOW    MCV 95.4 fL    MCH 30.7 pg    MCHC 32.1 gm/dL    RDW-CV 14.0 %    Platelet 186 THOUSAND/mcL    Neutrophils 80 %    Abs Neut 8.7 THOUSAND/mcL   HI    Segs NOT APPLICABLE    Lymph 11 %    Absolute Lymph 1.3 THOUSAND/mcL    Monocytes 7 %    Abs Mono 0.7 THOUSAND/mcL    Eosinophils 2 %    Absolute Eos 0.3 THOUSAND/mcL    Basophils 0 %    Absolute Baso 0.0 THOUSAND/mcL    Analyzer ANC NOT APPLICABLE    Percent Immature Granulocytes 0 %    Absolute Immature Granulocytes 0.0 THOUSAND/mcL    NRBC 0 /100 WBC       Impression and Plan   Assessment and Plan:       Diagnosis: Acute epigastric pain ([id number] R10.13, Diagnosis, Hospitalized, Medical) (Post-ERCP and most likely mild pancreatitis. IVF Pain meds and clear liquid today and advance as tolerated. If worsens can consider CT abdomen. ).   Yes

## 2020-04-08 PROCEDURE — 85610 PROTHROMBIN TIME: CPT

## 2020-04-08 PROCEDURE — 80053 COMPREHEN METABOLIC PANEL: CPT

## 2020-04-08 PROCEDURE — 85379 FIBRIN DEGRADATION QUANT: CPT

## 2020-04-08 PROCEDURE — 85730 THROMBOPLASTIN TIME PARTIAL: CPT

## 2020-04-08 PROCEDURE — 84702 CHORIONIC GONADOTROPIN TEST: CPT

## 2020-04-08 PROCEDURE — T1013: CPT

## 2020-04-08 PROCEDURE — 71045 X-RAY EXAM CHEST 1 VIEW: CPT

## 2020-04-08 PROCEDURE — 71275 CT ANGIOGRAPHY CHEST: CPT

## 2020-04-08 PROCEDURE — 85027 COMPLETE CBC AUTOMATED: CPT

## 2020-04-08 PROCEDURE — 99284 EMERGENCY DEPT VISIT MOD MDM: CPT | Mod: 25

## 2020-04-08 PROCEDURE — 84484 ASSAY OF TROPONIN QUANT: CPT

## 2020-04-08 PROCEDURE — 93005 ELECTROCARDIOGRAM TRACING: CPT

## 2020-04-08 PROCEDURE — 36415 COLL VENOUS BLD VENIPUNCTURE: CPT

## 2020-04-08 RX ORDER — ONDANSETRON 8 MG/1
1 TABLET, FILM COATED ORAL
Qty: 9 | Refills: 0
Start: 2020-04-08 | End: 2020-04-10

## 2020-04-08 RX ORDER — AZITHROMYCIN 500 MG/1
500 TABLET, FILM COATED ORAL ONCE
Refills: 0 | Status: COMPLETED | OUTPATIENT
Start: 2020-04-08 | End: 2020-04-08

## 2020-04-08 RX ORDER — AZITHROMYCIN 500 MG/1
1 TABLET, FILM COATED ORAL
Qty: 4 | Refills: 0
Start: 2020-04-08 | End: 2020-04-11

## 2020-04-08 RX ORDER — AZITHROMYCIN 500 MG/1
500 TABLET, FILM COATED ORAL ONCE
Refills: 0 | Status: DISCONTINUED | OUTPATIENT
Start: 2020-04-08 | End: 2020-04-08

## 2020-04-08 RX ADMIN — AZITHROMYCIN 500 MILLIGRAM(S): 500 TABLET, FILM COATED ORAL at 00:51

## 2020-04-10 ENCOUNTER — EMERGENCY (EMERGENCY)
Facility: HOSPITAL | Age: 45
LOS: 1 days | Discharge: HOME | End: 2020-04-10
Attending: EMERGENCY MEDICINE
Payer: COMMERCIAL

## 2020-04-10 VITALS
OXYGEN SATURATION: 97 % | TEMPERATURE: 98 F | SYSTOLIC BLOOD PRESSURE: 113 MMHG | WEIGHT: 149.91 LBS | HEART RATE: 83 BPM | HEIGHT: 60 IN | RESPIRATION RATE: 18 BRPM | DIASTOLIC BLOOD PRESSURE: 73 MMHG

## 2020-04-10 DIAGNOSIS — Z98.51 TUBAL LIGATION STATUS: Chronic | ICD-10-CM

## 2020-04-10 DIAGNOSIS — Z98.890 OTHER SPECIFIED POSTPROCEDURAL STATES: Chronic | ICD-10-CM

## 2020-04-10 LAB
ALBUMIN SERPL ELPH-MCNC: 4 G/DL — SIGNIFICANT CHANGE UP (ref 3.3–5.2)
ALP SERPL-CCNC: 95 U/L — SIGNIFICANT CHANGE UP (ref 40–120)
ALT FLD-CCNC: 18 U/L — SIGNIFICANT CHANGE UP
ANION GAP SERPL CALC-SCNC: 14 MMOL/L — SIGNIFICANT CHANGE UP (ref 5–17)
APPEARANCE UR: CLEAR — SIGNIFICANT CHANGE UP
AST SERPL-CCNC: 23 U/L — SIGNIFICANT CHANGE UP
BACTERIA # UR AUTO: ABNORMAL
BASOPHILS # BLD AUTO: 0.01 K/UL — SIGNIFICANT CHANGE UP (ref 0–0.2)
BASOPHILS NFR BLD AUTO: 0.1 % — SIGNIFICANT CHANGE UP (ref 0–2)
BILIRUB SERPL-MCNC: 0.2 MG/DL — LOW (ref 0.4–2)
BILIRUB UR-MCNC: NEGATIVE — SIGNIFICANT CHANGE UP
BUN SERPL-MCNC: 7 MG/DL — LOW (ref 8–20)
CALCIUM SERPL-MCNC: 9.3 MG/DL — SIGNIFICANT CHANGE UP (ref 8.6–10.2)
CHLORIDE SERPL-SCNC: 101 MMOL/L — SIGNIFICANT CHANGE UP (ref 98–107)
CO2 SERPL-SCNC: 24 MMOL/L — SIGNIFICANT CHANGE UP (ref 22–29)
COLOR SPEC: YELLOW — SIGNIFICANT CHANGE UP
CREAT SERPL-MCNC: 0.58 MG/DL — SIGNIFICANT CHANGE UP (ref 0.5–1.3)
DIFF PNL FLD: ABNORMAL
EOSINOPHIL # BLD AUTO: 0.03 K/UL — SIGNIFICANT CHANGE UP (ref 0–0.5)
EOSINOPHIL NFR BLD AUTO: 0.4 % — SIGNIFICANT CHANGE UP (ref 0–6)
EPI CELLS # UR: SIGNIFICANT CHANGE UP
GLUCOSE SERPL-MCNC: 100 MG/DL — HIGH (ref 70–99)
GLUCOSE UR QL: NEGATIVE MG/DL — SIGNIFICANT CHANGE UP
HCG UR QL: NEGATIVE — SIGNIFICANT CHANGE UP
HCT VFR BLD CALC: 34.5 % — SIGNIFICANT CHANGE UP (ref 34.5–45)
HGB BLD-MCNC: 11.2 G/DL — LOW (ref 11.5–15.5)
IMM GRANULOCYTES NFR BLD AUTO: 0.5 % — SIGNIFICANT CHANGE UP (ref 0–1.5)
KETONES UR-MCNC: NEGATIVE — SIGNIFICANT CHANGE UP
LEUKOCYTE ESTERASE UR-ACNC: ABNORMAL
LYMPHOCYTES # BLD AUTO: 1.33 K/UL — SIGNIFICANT CHANGE UP (ref 1–3.3)
LYMPHOCYTES # BLD AUTO: 16 % — SIGNIFICANT CHANGE UP (ref 13–44)
MCHC RBC-ENTMCNC: 30.2 PG — SIGNIFICANT CHANGE UP (ref 27–34)
MCHC RBC-ENTMCNC: 32.5 GM/DL — SIGNIFICANT CHANGE UP (ref 32–36)
MCV RBC AUTO: 93 FL — SIGNIFICANT CHANGE UP (ref 80–100)
MONOCYTES # BLD AUTO: 0.48 K/UL — SIGNIFICANT CHANGE UP (ref 0–0.9)
MONOCYTES NFR BLD AUTO: 5.8 % — SIGNIFICANT CHANGE UP (ref 2–14)
NEUTROPHILS # BLD AUTO: 6.44 K/UL — SIGNIFICANT CHANGE UP (ref 1.8–7.4)
NEUTROPHILS NFR BLD AUTO: 77.2 % — HIGH (ref 43–77)
NITRITE UR-MCNC: NEGATIVE — SIGNIFICANT CHANGE UP
PH UR: 7 — SIGNIFICANT CHANGE UP (ref 5–8)
PLATELET # BLD AUTO: 440 K/UL — HIGH (ref 150–400)
POTASSIUM SERPL-MCNC: 4 MMOL/L — SIGNIFICANT CHANGE UP (ref 3.5–5.3)
POTASSIUM SERPL-SCNC: 4 MMOL/L — SIGNIFICANT CHANGE UP (ref 3.5–5.3)
PROT SERPL-MCNC: 7.3 G/DL — SIGNIFICANT CHANGE UP (ref 6.6–8.7)
PROT UR-MCNC: 30 MG/DL
RBC # BLD: 3.71 M/UL — LOW (ref 3.8–5.2)
RBC # FLD: 12 % — SIGNIFICANT CHANGE UP (ref 10.3–14.5)
RBC CASTS # UR COMP ASSIST: ABNORMAL /HPF (ref 0–4)
SODIUM SERPL-SCNC: 139 MMOL/L — SIGNIFICANT CHANGE UP (ref 135–145)
SP GR SPEC: 1.01 — SIGNIFICANT CHANGE UP (ref 1.01–1.02)
UROBILINOGEN FLD QL: NEGATIVE MG/DL — SIGNIFICANT CHANGE UP
WBC # BLD: 8.33 K/UL — SIGNIFICANT CHANGE UP (ref 3.8–10.5)
WBC # FLD AUTO: 8.33 K/UL — SIGNIFICANT CHANGE UP (ref 3.8–10.5)
WBC UR QL: ABNORMAL

## 2020-04-10 PROCEDURE — 76856 US EXAM PELVIC COMPLETE: CPT | Mod: 26

## 2020-04-10 PROCEDURE — 80053 COMPREHEN METABOLIC PANEL: CPT

## 2020-04-10 PROCEDURE — 76830 TRANSVAGINAL US NON-OB: CPT | Mod: 26

## 2020-04-10 PROCEDURE — 96374 THER/PROPH/DIAG INJ IV PUSH: CPT

## 2020-04-10 PROCEDURE — T1013: CPT

## 2020-04-10 PROCEDURE — 76856 US EXAM PELVIC COMPLETE: CPT

## 2020-04-10 PROCEDURE — 81001 URINALYSIS AUTO W/SCOPE: CPT

## 2020-04-10 PROCEDURE — 85027 COMPLETE CBC AUTOMATED: CPT

## 2020-04-10 PROCEDURE — 81025 URINE PREGNANCY TEST: CPT

## 2020-04-10 PROCEDURE — 99284 EMERGENCY DEPT VISIT MOD MDM: CPT | Mod: 25

## 2020-04-10 PROCEDURE — 99285 EMERGENCY DEPT VISIT HI MDM: CPT

## 2020-04-10 PROCEDURE — 76830 TRANSVAGINAL US NON-OB: CPT

## 2020-04-10 RX ORDER — MORPHINE SULFATE 50 MG/1
4 CAPSULE, EXTENDED RELEASE ORAL ONCE
Refills: 0 | Status: DISCONTINUED | OUTPATIENT
Start: 2020-04-10 | End: 2020-04-10

## 2020-04-10 RX ORDER — NITROFURANTOIN MACROCRYSTAL 50 MG
1 CAPSULE ORAL
Qty: 14 | Refills: 0
Start: 2020-04-10 | End: 2020-04-16

## 2020-04-10 RX ADMIN — MORPHINE SULFATE 4 MILLIGRAM(S): 50 CAPSULE, EXTENDED RELEASE ORAL at 08:44

## 2020-04-10 NOTE — ED STATDOCS - PATIENT PORTAL LINK FT
You can access the FollowMyHealth Patient Portal offered by [facility] by registering at the following website: [email or web address]. By joining [facility]’s FollowMyHealth portal, you will also be able to view your health information using other applications (apps) compatible with our system.

## 2020-04-10 NOTE — ED ADULT NURSE NOTE - CAS ELECT INFOMATION PROVIDED
DC instructions provided by AILYN Garcia. Patient with no further questions for the RN. Ambulatory./DC instructions

## 2020-04-10 NOTE — ED STATDOCS - NS_ ATTENDINGSCRIBEDETAILS _ED_A_ED_FT
I, Dago Melara, performed the initial face to face bedside interview with this patient regarding history of present illness, review of symptoms and relevant past medical, social and family history.  I completed an independent physical examination.  I was the provider who initially evaluated this patient.  The history, relevant review of systems, past medical and surgical history, medical decision making, and physical examination was documented by the scribe in my presence and I attest to the accuracy of the documentation. Follow-up on ordered tests (ie labs, radiologic studies) and re-evaluation of the patient's status has been communicated to the ACP.  Disposition of the patient will be based on test outcome and response to ED interventions.

## 2020-04-10 NOTE — ED STATDOCS - PROGRESS NOTE DETAILS
Results discussed. Will tx UTI with macrobid. Importance of follow up with OB/GYN discussed using Gonzalez . Abdomen soft non tender at time of discharge.

## 2020-04-10 NOTE — ED STATDOCS - OBJECTIVE STATEMENT
45 y/o female with no PMHx presents to ED c/o urinary/ bladder trouble. Patient reports pain with urination and unable to urinate associated with burning with urination that started 5 days ago. Reports urinary issues in the past    Denies fever, flank pain, nausea, vomiting  LNMP: 1 week ago 2-3 weeks late, 15 days long normally 2-3 days  : Margie 45 y/o female with no PMHx presents to ED c/o urinary/ bladder trouble. Patient reports constant urge to urinate, pain with urination and difficulty urinating associated with burning with urination that started 5 days ago. Seen 4/5 for urinary symptoms and was laced on pyridium and cephalexin with some relief.  Seen 4/7 with chest pain and was placed on azithromycin.  Reports urinary issues in the past    Denies fever, flank pain, nausea, vomiting  LNMP: 1 week ago 2-3 weeks late, 15 days long normally 2-3 days  : Margie

## 2020-04-10 NOTE — ED STATDOCS - NSFOLLOWUPINSTRUCTIONS_ED_ALL_ED_FT
Abdominal Pain    Many things can cause abdominal pain. Many times, abdominal pain is not caused by a disease and will improve without treatment. Your health care provider will do a physical exam to determine if there is a dangerous cause of your pain; blood tests and imaging may help determine the cause of your pain. However, in many cases, no cause may be found and you may need further testing as an outpatient. Monitor your abdominal pain for any changes.     SEEK IMMEDIATE MEDICAL CARE IF YOU HAVE ANY OF THE FOLLOWING SYMPTOMS: worsening abdominal pain, uncontrollable vomiting, profuse diarrhea, inability to have bowel movements or pass gas, black or bloody stools, fever accompanying chest pain or back pain, or fainting. These symptoms may represent a serious problem that is an emergency. Do not wait to see if the symptoms will go away. Get medical help right away. Call 911 and do not drive yourself to the hospital.     Urinary Tract Infection    A urinary tract infection (UTI) is an infection of any part of the urinary tract, which includes the kidneys, ureters, bladder, and urethra. Risk factors include ignoring your need to urinate, wiping back to front if female, being an uncircumcised male, and having diabetes or a weak immune system. Symptoms include frequent urination, pain or burning with urination, foul smelling urine, cloudy urine, pain in the lower abdomen, blood in the urine, and fever. If you were prescribed an antibiotic medicine, take it as told by your health care provider. Do not stop taking the antibiotic even if you start to feel better.    SEEK IMMEDIATE MEDICAL CARE IF YOU HAVE ANY OF THE FOLLOWING SYMPTOMS: severe back or abdominal pain, fever, inability to keep fluids or medicine down, dizziness/lightheadedness, or a change in mental status.     Please follow up with OB/GYN within 4 days.

## 2020-07-15 ENCOUNTER — EMERGENCY (EMERGENCY)
Facility: HOSPITAL | Age: 45
LOS: 1 days | Discharge: HOME | End: 2020-07-15
Attending: EMERGENCY MEDICINE
Payer: COMMERCIAL

## 2020-07-15 VITALS
SYSTOLIC BLOOD PRESSURE: 100 MMHG | TEMPERATURE: 98 F | DIASTOLIC BLOOD PRESSURE: 68 MMHG | WEIGHT: 158.07 LBS | RESPIRATION RATE: 18 BRPM | OXYGEN SATURATION: 96 % | HEART RATE: 73 BPM | HEIGHT: 61 IN

## 2020-07-15 DIAGNOSIS — Z98.890 OTHER SPECIFIED POSTPROCEDURAL STATES: Chronic | ICD-10-CM

## 2020-07-15 DIAGNOSIS — Z98.51 TUBAL LIGATION STATUS: Chronic | ICD-10-CM

## 2020-07-15 LAB
ANION GAP SERPL CALC-SCNC: 13 MMOL/L — SIGNIFICANT CHANGE UP (ref 5–17)
APPEARANCE UR: CLEAR — SIGNIFICANT CHANGE UP
BASOPHILS # BLD AUTO: 0.01 K/UL — SIGNIFICANT CHANGE UP (ref 0–0.2)
BASOPHILS NFR BLD AUTO: 0.2 % — SIGNIFICANT CHANGE UP (ref 0–2)
BILIRUB UR-MCNC: NEGATIVE — SIGNIFICANT CHANGE UP
BUN SERPL-MCNC: 13 MG/DL — SIGNIFICANT CHANGE UP (ref 8–20)
CALCIUM SERPL-MCNC: 9.3 MG/DL — SIGNIFICANT CHANGE UP (ref 8.6–10.2)
CHLORIDE SERPL-SCNC: 101 MMOL/L — SIGNIFICANT CHANGE UP (ref 98–107)
CO2 SERPL-SCNC: 24 MMOL/L — SIGNIFICANT CHANGE UP (ref 22–29)
COLOR SPEC: YELLOW — SIGNIFICANT CHANGE UP
CREAT SERPL-MCNC: 0.47 MG/DL — LOW (ref 0.5–1.3)
DIFF PNL FLD: ABNORMAL
EOSINOPHIL # BLD AUTO: 0.05 K/UL — SIGNIFICANT CHANGE UP (ref 0–0.5)
EOSINOPHIL NFR BLD AUTO: 0.9 % — SIGNIFICANT CHANGE UP (ref 0–6)
EPI CELLS # UR: SIGNIFICANT CHANGE UP
GLUCOSE SERPL-MCNC: 100 MG/DL — HIGH (ref 70–99)
GLUCOSE UR QL: NEGATIVE MG/DL — SIGNIFICANT CHANGE UP
HCG UR QL: NEGATIVE — SIGNIFICANT CHANGE UP
HCT VFR BLD CALC: 35.4 % — SIGNIFICANT CHANGE UP (ref 34.5–45)
HGB BLD-MCNC: 11.8 G/DL — SIGNIFICANT CHANGE UP (ref 11.5–15.5)
IMM GRANULOCYTES NFR BLD AUTO: 0.2 % — SIGNIFICANT CHANGE UP (ref 0–1.5)
KETONES UR-MCNC: NEGATIVE — SIGNIFICANT CHANGE UP
LEUKOCYTE ESTERASE UR-ACNC: ABNORMAL
LYMPHOCYTES # BLD AUTO: 1.81 K/UL — SIGNIFICANT CHANGE UP (ref 1–3.3)
LYMPHOCYTES # BLD AUTO: 34.3 % — SIGNIFICANT CHANGE UP (ref 13–44)
MCHC RBC-ENTMCNC: 31.6 PG — SIGNIFICANT CHANGE UP (ref 27–34)
MCHC RBC-ENTMCNC: 33.3 GM/DL — SIGNIFICANT CHANGE UP (ref 32–36)
MCV RBC AUTO: 94.7 FL — SIGNIFICANT CHANGE UP (ref 80–100)
MONOCYTES # BLD AUTO: 0.39 K/UL — SIGNIFICANT CHANGE UP (ref 0–0.9)
MONOCYTES NFR BLD AUTO: 7.4 % — SIGNIFICANT CHANGE UP (ref 2–14)
NEUTROPHILS # BLD AUTO: 3.01 K/UL — SIGNIFICANT CHANGE UP (ref 1.8–7.4)
NEUTROPHILS NFR BLD AUTO: 57 % — SIGNIFICANT CHANGE UP (ref 43–77)
NITRITE UR-MCNC: NEGATIVE — SIGNIFICANT CHANGE UP
PH UR: 7 — SIGNIFICANT CHANGE UP (ref 5–8)
PLATELET # BLD AUTO: 232 K/UL — SIGNIFICANT CHANGE UP (ref 150–400)
POTASSIUM SERPL-MCNC: 3.9 MMOL/L — SIGNIFICANT CHANGE UP (ref 3.5–5.3)
POTASSIUM SERPL-SCNC: 3.9 MMOL/L — SIGNIFICANT CHANGE UP (ref 3.5–5.3)
PROT UR-MCNC: NEGATIVE MG/DL — SIGNIFICANT CHANGE UP
RBC # BLD: 3.74 M/UL — LOW (ref 3.8–5.2)
RBC # FLD: 12.9 % — SIGNIFICANT CHANGE UP (ref 10.3–14.5)
RBC CASTS # UR COMP ASSIST: ABNORMAL /HPF (ref 0–4)
SODIUM SERPL-SCNC: 138 MMOL/L — SIGNIFICANT CHANGE UP (ref 135–145)
SP GR SPEC: 1 — LOW (ref 1.01–1.02)
UROBILINOGEN FLD QL: NEGATIVE MG/DL — SIGNIFICANT CHANGE UP
WBC # BLD: 5.28 K/UL — SIGNIFICANT CHANGE UP (ref 3.8–10.5)
WBC # FLD AUTO: 5.28 K/UL — SIGNIFICANT CHANGE UP (ref 3.8–10.5)
WBC UR QL: ABNORMAL

## 2020-07-15 PROCEDURE — 80048 BASIC METABOLIC PNL TOTAL CA: CPT

## 2020-07-15 PROCEDURE — 36415 COLL VENOUS BLD VENIPUNCTURE: CPT

## 2020-07-15 PROCEDURE — 99284 EMERGENCY DEPT VISIT MOD MDM: CPT

## 2020-07-15 PROCEDURE — 81025 URINE PREGNANCY TEST: CPT

## 2020-07-15 PROCEDURE — 81001 URINALYSIS AUTO W/SCOPE: CPT

## 2020-07-15 PROCEDURE — 87086 URINE CULTURE/COLONY COUNT: CPT

## 2020-07-15 PROCEDURE — 85027 COMPLETE CBC AUTOMATED: CPT

## 2020-07-15 PROCEDURE — T1013: CPT

## 2020-07-15 RX ORDER — CEPHALEXIN 500 MG
1 CAPSULE ORAL
Qty: 28 | Refills: 0
Start: 2020-07-15 | End: 2020-07-21

## 2020-07-15 RX ORDER — ACETAMINOPHEN 500 MG
975 TABLET ORAL ONCE
Refills: 0 | Status: COMPLETED | OUTPATIENT
Start: 2020-07-15 | End: 2020-07-15

## 2020-07-15 RX ORDER — IBUPROFEN 200 MG
1 TABLET ORAL
Qty: 9 | Refills: 0
Start: 2020-07-15 | End: 2020-07-17

## 2020-07-15 RX ORDER — CEPHALEXIN 500 MG
500 CAPSULE ORAL ONCE
Refills: 0 | Status: COMPLETED | OUTPATIENT
Start: 2020-07-15 | End: 2020-07-15

## 2020-07-15 RX ADMIN — Medication 975 MILLIGRAM(S): at 19:18

## 2020-07-15 RX ADMIN — Medication 500 MILLIGRAM(S): at 19:30

## 2020-07-15 NOTE — ED STATDOCS - CLINICAL SUMMARY MEDICAL DECISION MAKING FREE TEXT BOX
43y/o F p/w vaginal pain and burning urination since today. Also notes bleeding since last Monday from biopsy. Unremarkable exam. Vital stable. Will just urine and basic labs to r/o anemia. Pain control and reevaul . 43y/o F p/w vaginal pain and burning urination since today. Also notes bleeding since last Monday from biopsy. Unremarkable exam. Vital stable. Will check urine for infection and pregnancy and basic labs to r/o anemia. Pain control. will send home with txa if bleeding on pelvic exam and antibiotic if urine positive.

## 2020-07-15 NOTE — ED STATDOCS - NS ED ROS FT
Constitutional: no fever  Eyes: no conjunctivitis  Ears: no ear pain   Nose: no nasal congestion, Mouth/Throat: no throat pain, Neck: no stiffness  Cardiovascular: no chest pain  Chest: no cough  Gastrointestinal: no abdominal pain, no vomiting and diarrhea  MSK: no joint pain  : +vaginal pain, burning urination and vaginal bleeding.   Skin: no rash  Neuro: no LOC

## 2020-07-15 NOTE — ED STATDOCS - NSFOLLOWUPINSTRUCTIONS_ED_ALL_ED_FT
Infección del tracto urinario    Teddy infección del tracto urinario (ITU) es teddy infección de cualquier parte del tracto urinario, que incluye los riñones, los uréteres, la vejiga y la uretra. Los factores de riesgo incluyen ignorar ortiz necesidad de orinar, limpiarse de atrás hacia adelante si es sejal, ser un hombre no circuncidado y tener diabetes o un sistema inmunitario débil. Los síntomas incluyen micción frecuente, dolor o ardor al orinar, orina con mal olor, orina turbia, dolor en la parte inferior del abdomen, chris en la orina y fiebre. Si le recetaron un medicamento antibiótico, tómelo según lo indicado por ortiz proveedor de atención médica. No deje de edilson el antibiótico incluso si comienza a sentirse mejor.    BUSQUE ATENCIÓN MÉDICA INMEDIATA SI TIENE CUALQUIERA DE LOS SÍNTOMAS SIGUIENTES: dolor severo de espalda o abdominal, fiebre, incapacidad para retener líquidos o medicamentos, mareos / aturdimiento o un cambio en el estado mental.    completar el curso completo de keflex según lo prescrito

## 2020-07-15 NOTE — ED STATDOCS - OBJECTIVE STATEMENT
45y/o with no significant PMHx presents to the ED c/o vaginal bleeding that began 1 week ago with burning urination that began this morning. Assoc. sx of mild vaginal pain which began today. Pt presented to GYN clinic for a Biopsy last Monday and reports that vaginal bleeding has been occurring since, was told that the bleeding would stop in 3 days and she states it has persisted. Hx of UTI. Denies nausea, vomiting, CP, SOB, urinary frequency, fever or chills.   : Gonzalez

## 2020-07-15 NOTE — ED STATDOCS - PATIENT PORTAL LINK FT
You can access the FollowMyHealth Patient Portal offered by [facility] by registering at the following website: [email or web address]. By joining [employer]’s FollowMyHealth portal, you will also be able to view your health information using other applications (apps) compatible with our system.

## 2020-07-15 NOTE — ED STATDOCS - PROGRESS NOTE DETAILS
PA NOTE: Pelvic exam completed with ED  Karen. No acute findings. Cervical os closed, no blood, no cervical tenderness. + UA. Will treat with course of keflex. Advised to follow up with H clinic upon discharge and return to ED for any new or worsening symptoms. labs and imaging reviewed with pt. given good instructions when to return to ED and importance of f/u.  all incidental findings were discussed with pt as well. pt verbalized understanding.

## 2020-07-15 NOTE — ED STATDOCS - PHYSICAL EXAMINATION
gen: well appearing  Mentation: AAO x 3  psych: mood appropriate  ENT: airway patent  Eyes: conjunctivae clear bilaterally  Cardio: RRR, no m/r/g  Resp: normal BS b/l  GI: s/nd. benign abdomen. CVAT  Neuro: AAO x 3, sensation and motor function intact, CN 2-12 intact  Skin: No evidence of rash  MSK: normal movement of all extremities gen: well appearing  Mentation: AAO x 3  psych: mood appropriate  ENT: airway patent  Eyes: conjunctivae clear bilaterally  Cardio: RRR, no m/r/g  Resp: normal BS b/l  GI: s/nd. benign abdomen. no CVAT  Neuro: AAO x 3, sensation and motor function intact  Skin: No evidence of rash  MSK: normal movement of all extremities

## 2020-07-16 LAB
CULTURE RESULTS: SIGNIFICANT CHANGE UP
SPECIMEN SOURCE: SIGNIFICANT CHANGE UP

## 2020-07-28 ENCOUNTER — EMERGENCY (EMERGENCY)
Facility: HOSPITAL | Age: 45
LOS: 1 days | Discharge: HOME | End: 2020-07-28
Attending: EMERGENCY MEDICINE
Payer: COMMERCIAL

## 2020-07-28 VITALS
WEIGHT: 154.1 LBS | RESPIRATION RATE: 18 BRPM | TEMPERATURE: 98 F | SYSTOLIC BLOOD PRESSURE: 102 MMHG | HEIGHT: 60 IN | DIASTOLIC BLOOD PRESSURE: 68 MMHG | HEART RATE: 88 BPM | OXYGEN SATURATION: 100 %

## 2020-07-28 DIAGNOSIS — Z98.51 TUBAL LIGATION STATUS: Chronic | ICD-10-CM

## 2020-07-28 DIAGNOSIS — Z98.890 OTHER SPECIFIED POSTPROCEDURAL STATES: Chronic | ICD-10-CM

## 2020-07-28 LAB
ALBUMIN SERPL ELPH-MCNC: 4.4 G/DL — SIGNIFICANT CHANGE UP (ref 3.3–5.2)
ALP SERPL-CCNC: 90 U/L — SIGNIFICANT CHANGE UP (ref 40–120)
ALT FLD-CCNC: 13 U/L — SIGNIFICANT CHANGE UP
ANION GAP SERPL CALC-SCNC: 10 MMOL/L — SIGNIFICANT CHANGE UP (ref 5–17)
APPEARANCE UR: CLEAR — SIGNIFICANT CHANGE UP
AST SERPL-CCNC: 20 U/L — SIGNIFICANT CHANGE UP
BACTERIA # UR AUTO: ABNORMAL
BASOPHILS # BLD AUTO: 0.01 K/UL — SIGNIFICANT CHANGE UP (ref 0–0.2)
BASOPHILS NFR BLD AUTO: 0.1 % — SIGNIFICANT CHANGE UP (ref 0–2)
BILIRUB SERPL-MCNC: 0.6 MG/DL — SIGNIFICANT CHANGE UP (ref 0.4–2)
BILIRUB UR-MCNC: NEGATIVE — SIGNIFICANT CHANGE UP
BUN SERPL-MCNC: 11 MG/DL — SIGNIFICANT CHANGE UP (ref 8–20)
CALCIUM SERPL-MCNC: 9 MG/DL — SIGNIFICANT CHANGE UP (ref 8.6–10.2)
CHLORIDE SERPL-SCNC: 105 MMOL/L — SIGNIFICANT CHANGE UP (ref 98–107)
CO2 SERPL-SCNC: 24 MMOL/L — SIGNIFICANT CHANGE UP (ref 22–29)
COLOR SPEC: ABNORMAL
CREAT SERPL-MCNC: 0.4 MG/DL — LOW (ref 0.5–1.3)
DIFF PNL FLD: ABNORMAL
EOSINOPHIL # BLD AUTO: 0.13 K/UL — SIGNIFICANT CHANGE UP (ref 0–0.5)
EOSINOPHIL NFR BLD AUTO: 1.4 % — SIGNIFICANT CHANGE UP (ref 0–6)
EPI CELLS # UR: SIGNIFICANT CHANGE UP
GLUCOSE SERPL-MCNC: 113 MG/DL — HIGH (ref 70–99)
GLUCOSE UR QL: NEGATIVE MG/DL — SIGNIFICANT CHANGE UP
HCT VFR BLD CALC: 35.9 % — SIGNIFICANT CHANGE UP (ref 34.5–45)
HGB BLD-MCNC: 12.2 G/DL — SIGNIFICANT CHANGE UP (ref 11.5–15.5)
IMM GRANULOCYTES NFR BLD AUTO: 0.3 % — SIGNIFICANT CHANGE UP (ref 0–1.5)
KETONES UR-MCNC: NEGATIVE — SIGNIFICANT CHANGE UP
LEUKOCYTE ESTERASE UR-ACNC: ABNORMAL
LYMPHOCYTES # BLD AUTO: 1.05 K/UL — SIGNIFICANT CHANGE UP (ref 1–3.3)
LYMPHOCYTES # BLD AUTO: 11.3 % — LOW (ref 13–44)
MCHC RBC-ENTMCNC: 31.9 PG — SIGNIFICANT CHANGE UP (ref 27–34)
MCHC RBC-ENTMCNC: 34 GM/DL — SIGNIFICANT CHANGE UP (ref 32–36)
MCV RBC AUTO: 93.7 FL — SIGNIFICANT CHANGE UP (ref 80–100)
MONOCYTES # BLD AUTO: 0.62 K/UL — SIGNIFICANT CHANGE UP (ref 0–0.9)
MONOCYTES NFR BLD AUTO: 6.7 % — SIGNIFICANT CHANGE UP (ref 2–14)
NEUTROPHILS # BLD AUTO: 7.42 K/UL — HIGH (ref 1.8–7.4)
NEUTROPHILS NFR BLD AUTO: 80.2 % — HIGH (ref 43–77)
NITRITE UR-MCNC: NEGATIVE — SIGNIFICANT CHANGE UP
PH UR: 7 — SIGNIFICANT CHANGE UP (ref 5–8)
PLATELET # BLD AUTO: 243 K/UL — SIGNIFICANT CHANGE UP (ref 150–400)
POTASSIUM SERPL-MCNC: 3.9 MMOL/L — SIGNIFICANT CHANGE UP (ref 3.5–5.3)
POTASSIUM SERPL-SCNC: 3.9 MMOL/L — SIGNIFICANT CHANGE UP (ref 3.5–5.3)
PROT SERPL-MCNC: 7.1 G/DL — SIGNIFICANT CHANGE UP (ref 6.6–8.7)
PROT UR-MCNC: 30 MG/DL
RBC # BLD: 3.83 M/UL — SIGNIFICANT CHANGE UP (ref 3.8–5.2)
RBC # FLD: 12.7 % — SIGNIFICANT CHANGE UP (ref 10.3–14.5)
RBC CASTS # UR COMP ASSIST: ABNORMAL /HPF (ref 0–4)
SODIUM SERPL-SCNC: 139 MMOL/L — SIGNIFICANT CHANGE UP (ref 135–145)
SP GR SPEC: 1 — LOW (ref 1.01–1.02)
UROBILINOGEN FLD QL: NEGATIVE MG/DL — SIGNIFICANT CHANGE UP
WBC # BLD: 9.26 K/UL — SIGNIFICANT CHANGE UP (ref 3.8–10.5)
WBC # FLD AUTO: 9.26 K/UL — SIGNIFICANT CHANGE UP (ref 3.8–10.5)
WBC UR QL: >50

## 2020-07-28 PROCEDURE — T1013: CPT

## 2020-07-28 PROCEDURE — 36415 COLL VENOUS BLD VENIPUNCTURE: CPT

## 2020-07-28 PROCEDURE — 80053 COMPREHEN METABOLIC PANEL: CPT

## 2020-07-28 PROCEDURE — 99284 EMERGENCY DEPT VISIT MOD MDM: CPT

## 2020-07-28 PROCEDURE — 99284 EMERGENCY DEPT VISIT MOD MDM: CPT | Mod: 25

## 2020-07-28 PROCEDURE — 96375 TX/PRO/DX INJ NEW DRUG ADDON: CPT

## 2020-07-28 PROCEDURE — 96374 THER/PROPH/DIAG INJ IV PUSH: CPT

## 2020-07-28 PROCEDURE — 87086 URINE CULTURE/COLONY COUNT: CPT

## 2020-07-28 PROCEDURE — 85027 COMPLETE CBC AUTOMATED: CPT

## 2020-07-28 PROCEDURE — 81001 URINALYSIS AUTO W/SCOPE: CPT

## 2020-07-28 RX ORDER — IBUPROFEN 200 MG
1 TABLET ORAL
Qty: 28 | Refills: 0
Start: 2020-07-28 | End: 2020-08-03

## 2020-07-28 RX ORDER — NITROFURANTOIN MACROCRYSTAL 50 MG
1 CAPSULE ORAL
Qty: 20 | Refills: 0
Start: 2020-07-28 | End: 2020-08-06

## 2020-07-28 RX ORDER — KETOROLAC TROMETHAMINE 30 MG/ML
30 SYRINGE (ML) INJECTION ONCE
Refills: 0 | Status: DISCONTINUED | OUTPATIENT
Start: 2020-07-28 | End: 2020-07-28

## 2020-07-28 RX ADMIN — Medication 30 MILLIGRAM(S): at 09:37

## 2020-07-28 NOTE — ED STATDOCS - ATTENDING CONTRIBUTION TO CARE
I, Derik Hilton, performed the initial face to face bedside interview with this patient regarding history of present illness, review of symptoms and relevant past medical, social and family history.  I completed an independent physical examination.  I was the initial provider who evaluated this patient. I have signed out the follow up of any pending tests (i.e. labs, radiological studies) to the resident.  I have communicated the patient’s plan of care and disposition with the resident.

## 2020-07-28 NOTE — ED STATDOCS - PATIENT PORTAL LINK FT
You can access the FollowMyHealth Patient Portal offered by [facility] by registering at the following website: [email or web address]. By joining [employer]’s FollowMyHealth portal, you will also be able to view your health information using other applications (apps) compatible with our system.

## 2020-07-28 NOTE — ED STATDOCS - OBJECTIVE STATEMENT
45 y/o F pt with significant PMHx of UTI presents to the ED. Pt states she is in pain and cannot urinate. She states when she tries to go "its only a little." Notes that she has been seen in ED for similar symptoms but pain always comes back. She states that she cant have intercourse due to pain, but has not followed up with OB. She has been having this pain for years.

## 2020-07-28 NOTE — ED STATDOCS - CLINICAL SUMMARY MEDICAL DECISION MAKING FREE TEXT BOX
Pt with urinary urgency and dysuria concern for uti. Pt also with dyspareunia but this has been chronic for years. Normal pelvic exam in ED within last 2 weeks. Needs GYN followup. suspect vaginal atrophy.

## 2020-07-28 NOTE — ED STATDOCS - CARE PLAN
Principal Discharge DX:	UTI (urinary tract infection)  Assessment and plan of treatment:	Patient is stable. Will d/c with course of Macrobid and Ibuprofen.

## 2020-07-28 NOTE — ED STATDOCS - PROGRESS NOTE DETAILS
45 y/o female seen in [facility] she is experiencing difficulty urinating since 0000 this am. She has a history of recurrent UTI and was last seen in the ED earlier this month for similar symptoms. Reports suprapubic abdominal pain. Patient states that she did finish the trial of antibiotics prescribed to her in the ED. Denies fever, nausea, vomiting, diarrhea, chance of pregnancy. Assessment consistent with that from the STAT docs. 43 y/o female seen in [facility] she is experiencing difficulty urinating since 0000 this am. She has a history of recurrent UTI and was last seen in the ED earlier this month for similar symptoms. Reports suprapubic abdominal pain. Patient states that she did finish the trial of Keflex prescribed to her in the ED. Denies fever, nausea, vomiting, diarrhea, chance of pregnancy. Assessment consistent with that from the STAT docs.

## 2020-07-28 NOTE — ED STATDOCS - NSFOLLOWUPINSTRUCTIONS_ED_ALL_ED_FT
Infección del tracto urinario    Teddy infección del tracto urinario (ITU) es teddy infección de cualquier parte del tracto urinario, que incluye los riñones, los uréteres, la vejiga y la uretra. Los factores de riesgo incluyen ignorar ortiz necesidad de orinar, limpiarse de atrás hacia adelante si es sejal, ser un hombre no circuncidado y tener diabetes o un sistema inmunitario débil. Los síntomas incluyen micción frecuente, dolor o ardor al orinar, orina con mal olor, orina turbia, dolor en la parte inferior del abdomen, chris en la orina y fiebre. Si le recetaron un medicamento antibiótico, tómelo según lo indicado por ortiz proveedor de atención médica. No deje de edilson el antibiótico incluso si comienza a sentirse mejor.    BUSQUE ATENCIÓN MÉDICA INMEDIATA SI TIENE CUALQUIERA DE LOS SÍNTOMAS SIGUIENTES: dolor severo de espalda o abdominal, fiebre, incapacidad para retener líquidos o medicamentos, mareos / aturdimiento o un cambio en el estado mental.

## 2020-07-29 LAB
CULTURE RESULTS: NO GROWTH — SIGNIFICANT CHANGE UP
SPECIMEN SOURCE: SIGNIFICANT CHANGE UP

## 2020-11-27 ENCOUNTER — EMERGENCY (EMERGENCY)
Facility: HOSPITAL | Age: 45
LOS: 1 days | Discharge: HOME | End: 2020-11-27
Attending: STUDENT IN AN ORGANIZED HEALTH CARE EDUCATION/TRAINING PROGRAM
Payer: COMMERCIAL

## 2020-11-27 VITALS
RESPIRATION RATE: 18 BRPM | WEIGHT: 169.98 LBS | HEIGHT: 60 IN | TEMPERATURE: 98 F | DIASTOLIC BLOOD PRESSURE: 75 MMHG | SYSTOLIC BLOOD PRESSURE: 108 MMHG | HEART RATE: 75 BPM | OXYGEN SATURATION: 98 %

## 2020-11-27 DIAGNOSIS — Z98.51 TUBAL LIGATION STATUS: Chronic | ICD-10-CM

## 2020-11-27 DIAGNOSIS — Z98.890 OTHER SPECIFIED POSTPROCEDURAL STATES: Chronic | ICD-10-CM

## 2020-11-27 LAB
APPEARANCE UR: CLEAR — SIGNIFICANT CHANGE UP
BACTERIA # UR AUTO: ABNORMAL
BILIRUB UR-MCNC: NEGATIVE — SIGNIFICANT CHANGE UP
COLOR SPEC: YELLOW — SIGNIFICANT CHANGE UP
DIFF PNL FLD: ABNORMAL
EPI CELLS # UR: SIGNIFICANT CHANGE UP
GLUCOSE UR QL: NEGATIVE MG/DL — SIGNIFICANT CHANGE UP
HCG UR QL: NEGATIVE — SIGNIFICANT CHANGE UP
KETONES UR-MCNC: NEGATIVE — SIGNIFICANT CHANGE UP
LEUKOCYTE ESTERASE UR-ACNC: ABNORMAL
NITRITE UR-MCNC: NEGATIVE — SIGNIFICANT CHANGE UP
PH UR: 7 — SIGNIFICANT CHANGE UP (ref 5–8)
PROT UR-MCNC: NEGATIVE MG/DL — SIGNIFICANT CHANGE UP
RBC CASTS # UR COMP ASSIST: ABNORMAL /HPF (ref 0–4)
SP GR SPEC: 1.01 — SIGNIFICANT CHANGE UP (ref 1.01–1.02)
UROBILINOGEN FLD QL: NEGATIVE MG/DL — SIGNIFICANT CHANGE UP
WBC UR QL: ABNORMAL

## 2020-11-27 PROCEDURE — 99284 EMERGENCY DEPT VISIT MOD MDM: CPT

## 2020-11-27 PROCEDURE — 87086 URINE CULTURE/COLONY COUNT: CPT

## 2020-11-27 PROCEDURE — 96372 THER/PROPH/DIAG INJ SC/IM: CPT

## 2020-11-27 PROCEDURE — 99283 EMERGENCY DEPT VISIT LOW MDM: CPT | Mod: 25

## 2020-11-27 PROCEDURE — 81001 URINALYSIS AUTO W/SCOPE: CPT

## 2020-11-27 PROCEDURE — 81025 URINE PREGNANCY TEST: CPT

## 2020-11-27 RX ORDER — CEPHALEXIN 500 MG
500 CAPSULE ORAL ONCE
Refills: 0 | Status: COMPLETED | OUTPATIENT
Start: 2020-11-27 | End: 2020-11-27

## 2020-11-27 RX ORDER — CEPHALEXIN 500 MG
1 CAPSULE ORAL
Qty: 28 | Refills: 0
Start: 2020-11-27 | End: 2020-12-03

## 2020-11-27 RX ORDER — PHENAZOPYRIDINE HCL 100 MG
1 TABLET ORAL
Qty: 6 | Refills: 0
Start: 2020-11-27 | End: 2020-11-28

## 2020-11-27 RX ORDER — KETOROLAC TROMETHAMINE 30 MG/ML
30 SYRINGE (ML) INJECTION ONCE
Refills: 0 | Status: DISCONTINUED | OUTPATIENT
Start: 2020-11-27 | End: 2020-11-27

## 2020-11-27 RX ADMIN — Medication 500 MILLIGRAM(S): at 04:40

## 2020-11-27 RX ADMIN — Medication 30 MILLIGRAM(S): at 04:40

## 2020-11-27 NOTE — ED ADULT TRIAGE NOTE - CHIEF COMPLAINT QUOTE
Patient is alert and oriented x3 c/o dysuria with increased frequency of urination x1 day. denies abd pain or n/v/d.

## 2020-11-27 NOTE — ED PROVIDER NOTE - PATIENT PORTAL LINK FT
You can access the FollowMyHealth Patient Portal offered by [facility] by registering at the following website: [email or web address]. By joining [employer]’s FollowMyHealth portal, you will also be able to view your health information using other applications (apps) compatible with our system.

## 2020-11-27 NOTE — ED PROVIDER NOTE - NSFOLLOWUPINSTRUCTIONS_ED_ALL_ED_FT
[place] los medicamentos según lo prescrito  Seguimiento con el médico de cabecera en 2-3 días  [place] Motrin y Tylenol según sea necesario para el dolor    Infección del tracto urinario    Teddy infección del tracto urinario (ITU) es teddy infección de cualquier parte del tracto urinario, que incluye los riñones, los uréteres, la vejiga y la uretra. Los factores de riesgo incluyen ignorar ortiz necesidad de orinar, limpiarse de atrás hacia adelante si es sejal, ser un hombre incircunciso y tener diabetes o un sistema inmunológico débil. Los síntomas incluyen micción frecuente, dolor o ardor al orinar, orina maloliente, orina turbia, dolor en la parte inferior del abdomen, chris en la orina y fiebre. Si le recetaron un antibiótico, tómelo según las indicaciones de ortiz proveedor de atención médica. No deje de edilson el antibiótico aunque empiece a sentirse mejor.    BUSQUE ATENCIÓN MÉDICA INMEDIATA SI TIENE ALGUNO DE LOS SIGUIENTES SÍNTOMAS: dolor severo de espalda o abdominal, fiebre, incapacidad para retener líquidos o medicamentos, mareos / aturdimiento o un cambio en el estado mental.

## 2020-11-27 NOTE — ED ADULT NURSE NOTE - NS ED NURSE LEVEL OF CONSCIOUSNESS SPEECH
Speaking Coherently Type 2 diabetes mellitus without complication, without long-term current use of insulin Essential hypertension

## 2020-11-27 NOTE — ED ADULT NURSE NOTE - OBJECTIVE STATEMENT
Pt AOx4 Pt son used for translation Pt complaining of lower ABD pain with trouble urinating and pain while urinating. Will give meds as per orders. Pt in no acute distress will continue to monitor.

## 2020-11-27 NOTE — ED PROVIDER NOTE - OBJECTIVE STATEMENT
44 year old female with no PMHx presents to the ED for dysuria and frequent urination x 1 day. Denies abd pain, back pain, flank pain, N/V/D, fever chills.

## 2020-11-27 NOTE — ED PROVIDER NOTE - ATTENDING CONTRIBUTION TO CARE
43yo female with no pmh presents with dysuria and increased freq for 1 day. Pt denies STI, vaginal bleeding/discharge, fevers/chills, ha, loc, focal neuro deficits, cp/sob/palp, cough, abd pain/n/v/d, recent travel and sick contacts.  +uti, abx, follow up

## 2020-11-28 LAB
CULTURE RESULTS: SIGNIFICANT CHANGE UP
SPECIMEN SOURCE: SIGNIFICANT CHANGE UP

## 2021-04-16 NOTE — ED ADULT TRIAGE NOTE - NS ED NOTE AC HIGH RISK COUNTRIES
Detail Level: Detailed Quality 130: Documentation Of Current Medications In The Medical Record: Eligible clinician attests to documenting in the medical record the patient is not eligible for a current list of medications being obtained, updated, or reviewed by the eligible clinician Additional Notes: Patient has no medications No

## 2021-06-22 NOTE — ED ADULT TRIAGE NOTE - AS O2 DELIVERY
Final Anesthesia Post-op Assessment    Patient: Emma Spencer  Procedure(s) Performed: VITRECTOMY 25G AND OIL REMOVAL LEFT EYE, BILATERAL EYE EXAM UNDER ANESTHESIA,  Anesthesia type: General    Vitals Value Taken Time   Temp 36.4 °C (97.5 °F) 06/22/21 0944   Pulse 99 06/22/21 0950   Resp 18 06/22/21 0950   SpO2 100 % 06/22/21 0950   /52 06/22/21 0950         Patient Location: PACU Phase 1  Post-op Vital Signs:stable  Level of Consciousness: awake  Respiratory Status: spontaneous ventilation  Cardiovascular stable  Hydration: euvolemic  Pain Management: adequately controlled  Handoff: Handoff to receiving nurse was performed and questions were answered  Vomiting: none  Nausea: None  Airway Patency:patent  Comments: Patient did well. No complications. Dental exam unchanged. Report given.    IVF: 600  UO: 0  EBL: 0      No complications documented.    room air

## 2022-01-25 ENCOUNTER — EMERGENCY (EMERGENCY)
Facility: HOSPITAL | Age: 47
LOS: 1 days | Discharge: HOME | End: 2022-01-25
Attending: STUDENT IN AN ORGANIZED HEALTH CARE EDUCATION/TRAINING PROGRAM
Payer: COMMERCIAL

## 2022-01-25 VITALS
SYSTOLIC BLOOD PRESSURE: 107 MMHG | DIASTOLIC BLOOD PRESSURE: 77 MMHG | TEMPERATURE: 98 F | WEIGHT: 160.94 LBS | RESPIRATION RATE: 16 BRPM | OXYGEN SATURATION: 97 % | HEART RATE: 67 BPM | HEIGHT: 60 IN

## 2022-01-25 DIAGNOSIS — Z98.890 OTHER SPECIFIED POSTPROCEDURAL STATES: Chronic | ICD-10-CM

## 2022-01-25 DIAGNOSIS — Z98.51 TUBAL LIGATION STATUS: Chronic | ICD-10-CM

## 2022-01-25 LAB
APPEARANCE UR: ABNORMAL
BACTERIA # UR AUTO: ABNORMAL
BILIRUB UR-MCNC: NEGATIVE — SIGNIFICANT CHANGE UP
COLOR SPEC: YELLOW — SIGNIFICANT CHANGE UP
DIFF PNL FLD: ABNORMAL
EPI CELLS # UR: SIGNIFICANT CHANGE UP
GLUCOSE UR QL: NEGATIVE MG/DL — SIGNIFICANT CHANGE UP
HCG UR QL: NEGATIVE — SIGNIFICANT CHANGE UP
KETONES UR-MCNC: NEGATIVE — SIGNIFICANT CHANGE UP
LEUKOCYTE ESTERASE UR-ACNC: ABNORMAL
NITRITE UR-MCNC: NEGATIVE — SIGNIFICANT CHANGE UP
PH UR: 7 — SIGNIFICANT CHANGE UP (ref 5–8)
PROT UR-MCNC: NEGATIVE MG/DL — SIGNIFICANT CHANGE UP
RBC CASTS # UR COMP ASSIST: ABNORMAL /HPF (ref 0–4)
SP GR SPEC: 1.01 — SIGNIFICANT CHANGE UP (ref 1.01–1.02)
UROBILINOGEN FLD QL: NEGATIVE MG/DL — SIGNIFICANT CHANGE UP
WBC UR QL: ABNORMAL /HPF (ref 0–5)

## 2022-01-25 PROCEDURE — 81001 URINALYSIS AUTO W/SCOPE: CPT

## 2022-01-25 PROCEDURE — 81025 URINE PREGNANCY TEST: CPT

## 2022-01-25 PROCEDURE — 99283 EMERGENCY DEPT VISIT LOW MDM: CPT

## 2022-01-25 PROCEDURE — 99284 EMERGENCY DEPT VISIT MOD MDM: CPT

## 2022-01-25 RX ORDER — IBUPROFEN 200 MG
600 TABLET ORAL ONCE
Refills: 0 | Status: COMPLETED | OUTPATIENT
Start: 2022-01-25 | End: 2022-01-25

## 2022-01-25 RX ORDER — CEPHALEXIN 500 MG
500 CAPSULE ORAL EVERY 12 HOURS
Refills: 0 | Status: DISCONTINUED | OUTPATIENT
Start: 2022-01-25 | End: 2022-01-29

## 2022-01-25 RX ORDER — CEPHALEXIN 500 MG
1 CAPSULE ORAL
Qty: 28 | Refills: 0
Start: 2022-01-25 | End: 2022-01-31

## 2022-01-25 RX ADMIN — Medication 600 MILLIGRAM(S): at 07:51

## 2022-01-25 RX ADMIN — Medication 500 MILLIGRAM(S): at 07:51

## 2022-01-25 NOTE — ED PROVIDER NOTE - PHYSICAL EXAMINATION
Vital Signs per nursing documentation  Gen: well appearing, no acute distress  HEENT: NCAT, MMM  Cardiac: regular rate rhythm, normal S1S2  Chest: clear to auscultation bilateral  Abdomen: soft, non tender non distended  Extremity: no gross deformity, good perfusion  Skin: no rash  Neuro: nonfocal neuro exam, gait steady

## 2022-01-25 NOTE — ED PROVIDER NOTE - OBJECTIVE STATEMENT
46 yof no sig pmh here with dysuria, frequency since last night. Feels similar to previous UTIs. Denies any concern for STD. No vaginal symptoms. Denies f/c, n/v, cp, sob, back pain

## 2022-01-25 NOTE — ED PROVIDER NOTE - NSFOLLOWUPINSTRUCTIONS_ED_ALL_ED_FT
Infección del tracto urinario    Teddy infección del tracto urinario (ITU) es teddy infección de cualquier parte del tracto urinario, que incluye los riñones, los uréteres, la vejiga y la uretra. Los factores de riesgo incluyen ignorar ortiz necesidad de orinar, limpiarse de atrás hacia adelante si es sejal, ser un hombre no circuncidado y tener diabetes o un sistema inmunológico débil. Los síntomas incluyen micción frecuente, dolor o ardor al orinar, orina maloliente, orina turbia, dolor en la parte inferior del abdomen, chris en la orina y fiebre. Si le recetaron un antibiótico, tómelo según las indicaciones de ortiz proveedor de atención médica. No deje de edilson el antibiótico aunque empiece a sentirse mejor.    BUSQUE ATENCIÓN MÉDICA INMEDIATA SI TIENE ALGUNO DE LOS SIGUIENTES SÍNTOMAS: dolor severo de espalda o abdominal, fiebre, incapacidad para retener líquidos o medicamentos, mareos / aturdimiento o un cambio en el estado mental.

## 2022-01-25 NOTE — ED PROVIDER NOTE - NS ED ROS FT
ROS:  GEN: (-) fevers/chills  NECK: (-) stiffness, (-) swelling  RESP: (-) shortness of breath, (-) cough  CV: (-) chest pain, (-) palpitations  GI: (-) nausea, (-) vomiting, (-) pain  : (-) hematuria, (+) dysuria  EXT: (-) edema  NEURO: (-) weakness, (-) headache  MSK: (-) muscle pain

## 2022-01-25 NOTE — ED PROVIDER NOTE - PATIENT PORTAL LINK FT
You can access the FollowMyHealth Patient Portal offered by [facility] by registering at the following website: [email or web address]. By joining [employer]’s FollowMyHealth portal, you will also be able to view your health information using other applications (apps) compatible with our system.

## 2022-01-28 NOTE — ED STATDOCS - NS ED MD SCRIBE ATTENDING SCRIBE SECTIONS
DISPOSITION/VITAL SIGNS( Pullset)/HIV/HISTORY OF PRESENT ILLNESS/PAST MEDICAL/SURGICAL/SOCIAL HISTORY/PHYSICAL EXAM/REVIEW OF SYSTEMS
Normal muscle tone/strength

## 2022-04-28 ENCOUNTER — EMERGENCY (EMERGENCY)
Facility: HOSPITAL | Age: 47
LOS: 1 days | Discharge: HOME | End: 2022-04-28
Attending: EMERGENCY MEDICINE
Payer: COMMERCIAL

## 2022-04-28 VITALS
TEMPERATURE: 98 F | HEIGHT: 60 IN | DIASTOLIC BLOOD PRESSURE: 70 MMHG | RESPIRATION RATE: 18 BRPM | WEIGHT: 149.91 LBS | SYSTOLIC BLOOD PRESSURE: 100 MMHG | HEART RATE: 89 BPM | OXYGEN SATURATION: 98 %

## 2022-04-28 DIAGNOSIS — Z98.51 TUBAL LIGATION STATUS: Chronic | ICD-10-CM

## 2022-04-28 DIAGNOSIS — Z98.890 OTHER SPECIFIED POSTPROCEDURAL STATES: Chronic | ICD-10-CM

## 2022-04-28 LAB
ALBUMIN SERPL ELPH-MCNC: 4.7 G/DL — SIGNIFICANT CHANGE UP (ref 3.3–5.2)
ALP SERPL-CCNC: 138 U/L — HIGH (ref 40–120)
ALT FLD-CCNC: 23 U/L — SIGNIFICANT CHANGE UP
ANION GAP SERPL CALC-SCNC: 14 MMOL/L — SIGNIFICANT CHANGE UP (ref 5–17)
AST SERPL-CCNC: 24 U/L — SIGNIFICANT CHANGE UP
BASOPHILS # BLD AUTO: 0 K/UL — SIGNIFICANT CHANGE UP (ref 0–0.2)
BASOPHILS NFR BLD AUTO: 0 % — SIGNIFICANT CHANGE UP (ref 0–2)
BILIRUB SERPL-MCNC: 0.8 MG/DL — SIGNIFICANT CHANGE UP (ref 0.4–2)
BUN SERPL-MCNC: 13.7 MG/DL — SIGNIFICANT CHANGE UP (ref 8–20)
CALCIUM SERPL-MCNC: 9.7 MG/DL — SIGNIFICANT CHANGE UP (ref 8.6–10.2)
CHLORIDE SERPL-SCNC: 104 MMOL/L — SIGNIFICANT CHANGE UP (ref 98–107)
CO2 SERPL-SCNC: 21 MMOL/L — LOW (ref 22–29)
CREAT SERPL-MCNC: 0.64 MG/DL — SIGNIFICANT CHANGE UP (ref 0.5–1.3)
EGFR: 110 ML/MIN/1.73M2 — SIGNIFICANT CHANGE UP
EOSINOPHIL # BLD AUTO: 0.07 K/UL — SIGNIFICANT CHANGE UP (ref 0–0.5)
EOSINOPHIL NFR BLD AUTO: 1.3 % — SIGNIFICANT CHANGE UP (ref 0–6)
GLUCOSE SERPL-MCNC: 103 MG/DL — HIGH (ref 70–99)
HCG SERPL-ACNC: <4 MIU/ML — SIGNIFICANT CHANGE UP
HCT VFR BLD CALC: 42.7 % — SIGNIFICANT CHANGE UP (ref 34.5–45)
HGB BLD-MCNC: 13.9 G/DL — SIGNIFICANT CHANGE UP (ref 11.5–15.5)
HIV 1 & 2 AB SERPL IA.RAPID: SIGNIFICANT CHANGE UP
IMM GRANULOCYTES NFR BLD AUTO: 0.2 % — SIGNIFICANT CHANGE UP (ref 0–1.5)
LYMPHOCYTES # BLD AUTO: 0.63 K/UL — LOW (ref 1–3.3)
LYMPHOCYTES # BLD AUTO: 11.7 % — LOW (ref 13–44)
MCHC RBC-ENTMCNC: 30.5 PG — SIGNIFICANT CHANGE UP (ref 27–34)
MCHC RBC-ENTMCNC: 32.6 GM/DL — SIGNIFICANT CHANGE UP (ref 32–36)
MCV RBC AUTO: 93.8 FL — SIGNIFICANT CHANGE UP (ref 80–100)
MONOCYTES # BLD AUTO: 0.41 K/UL — SIGNIFICANT CHANGE UP (ref 0–0.9)
MONOCYTES NFR BLD AUTO: 7.6 % — SIGNIFICANT CHANGE UP (ref 2–14)
NEUTROPHILS # BLD AUTO: 4.28 K/UL — SIGNIFICANT CHANGE UP (ref 1.8–7.4)
NEUTROPHILS NFR BLD AUTO: 79.2 % — HIGH (ref 43–77)
PLATELET # BLD AUTO: 223 K/UL — SIGNIFICANT CHANGE UP (ref 150–400)
POTASSIUM SERPL-MCNC: 4 MMOL/L — SIGNIFICANT CHANGE UP (ref 3.5–5.3)
POTASSIUM SERPL-SCNC: 4 MMOL/L — SIGNIFICANT CHANGE UP (ref 3.5–5.3)
PROT SERPL-MCNC: 8.1 G/DL — SIGNIFICANT CHANGE UP (ref 6.6–8.7)
RBC # BLD: 4.55 M/UL — SIGNIFICANT CHANGE UP (ref 3.8–5.2)
RBC # FLD: 11.9 % — SIGNIFICANT CHANGE UP (ref 10.3–14.5)
SODIUM SERPL-SCNC: 139 MMOL/L — SIGNIFICANT CHANGE UP (ref 135–145)
WBC # BLD: 5.4 K/UL — SIGNIFICANT CHANGE UP (ref 3.8–10.5)
WBC # FLD AUTO: 5.4 K/UL — SIGNIFICANT CHANGE UP (ref 3.8–10.5)

## 2022-04-28 PROCEDURE — 36415 COLL VENOUS BLD VENIPUNCTURE: CPT

## 2022-04-28 PROCEDURE — 85025 COMPLETE CBC W/AUTO DIFF WBC: CPT

## 2022-04-28 PROCEDURE — 80053 COMPREHEN METABOLIC PANEL: CPT

## 2022-04-28 PROCEDURE — 99283 EMERGENCY DEPT VISIT LOW MDM: CPT

## 2022-04-28 PROCEDURE — 99284 EMERGENCY DEPT VISIT MOD MDM: CPT

## 2022-04-28 PROCEDURE — 86703 HIV-1/HIV-2 1 RESULT ANTBDY: CPT

## 2022-04-28 PROCEDURE — 84702 CHORIONIC GONADOTROPIN TEST: CPT

## 2022-04-28 RX ORDER — SODIUM CHLORIDE 9 MG/ML
1000 INJECTION INTRAMUSCULAR; INTRAVENOUS; SUBCUTANEOUS ONCE
Refills: 0 | Status: COMPLETED | OUTPATIENT
Start: 2022-04-28 | End: 2022-04-28

## 2022-04-28 RX ADMIN — SODIUM CHLORIDE 1000 MILLILITER(S): 9 INJECTION INTRAMUSCULAR; INTRAVENOUS; SUBCUTANEOUS at 09:45

## 2022-04-28 RX ADMIN — SODIUM CHLORIDE 1000 MILLILITER(S): 9 INJECTION INTRAMUSCULAR; INTRAVENOUS; SUBCUTANEOUS at 10:09

## 2022-04-28 NOTE — ED PROVIDER NOTE - PROGRESS NOTE DETAILS
Pt moved form intake Room. Pt seen and evaluated by intake Physician. HPI, Physical examination performed by intake Physician . Note reviewed and followup examination performed by me consistent with initial assessment. Agrees with intake Physician plan and tests. Pt Labs, UA are within normal limits. Results discussed with patient and pt states understanding.  A copy of labs were provided upon discharge.

## 2022-04-28 NOTE — ED PROVIDER NOTE - PATIENT PORTAL LINK FT
You can access the FollowMyHealth Patient Portal offered by [facility] by registering at the following website: [email or web address]. By joining [employer]’s FollowMyHealth portal, you will also be able to view your health information using other applications (apps) compatible with our system.

## 2022-04-28 NOTE — ED PROVIDER NOTE - NS ED ATTENDING STATEMENT MOD
This was a shared visit with the GRACIA. I reviewed and verified the documentation and independently performed the documented:

## 2022-06-20 ENCOUNTER — EMERGENCY (EMERGENCY)
Facility: HOSPITAL | Age: 47
LOS: 1 days | Discharge: HOME | End: 2022-06-20
Attending: EMERGENCY MEDICINE
Payer: COMMERCIAL

## 2022-06-20 VITALS
HEART RATE: 83 BPM | RESPIRATION RATE: 18 BRPM | SYSTOLIC BLOOD PRESSURE: 122 MMHG | TEMPERATURE: 98 F | OXYGEN SATURATION: 98 % | DIASTOLIC BLOOD PRESSURE: 80 MMHG | HEIGHT: 60 IN | WEIGHT: 156.09 LBS

## 2022-06-20 DIAGNOSIS — Z98.890 OTHER SPECIFIED POSTPROCEDURAL STATES: Chronic | ICD-10-CM

## 2022-06-20 DIAGNOSIS — Z98.51 TUBAL LIGATION STATUS: Chronic | ICD-10-CM

## 2022-06-20 LAB
APPEARANCE UR: ABNORMAL
BACTERIA # UR AUTO: ABNORMAL
BILIRUB UR-MCNC: NEGATIVE — SIGNIFICANT CHANGE UP
COLOR SPEC: ABNORMAL
DIFF PNL FLD: ABNORMAL
EPI CELLS # UR: SIGNIFICANT CHANGE UP
GLUCOSE UR QL: NEGATIVE MG/DL — SIGNIFICANT CHANGE UP
HCG UR QL: NEGATIVE — SIGNIFICANT CHANGE UP
KETONES UR-MCNC: ABNORMAL
LEUKOCYTE ESTERASE UR-ACNC: ABNORMAL
NITRITE UR-MCNC: NEGATIVE — SIGNIFICANT CHANGE UP
PH UR: 5 — SIGNIFICANT CHANGE UP (ref 5–8)
PROT UR-MCNC: 30 MG/DL
RBC CASTS # UR COMP ASSIST: >50 /HPF (ref 0–4)
SP GR SPEC: 1.02 — SIGNIFICANT CHANGE UP (ref 1.01–1.02)
UROBILINOGEN FLD QL: NEGATIVE MG/DL — SIGNIFICANT CHANGE UP
WBC UR QL: ABNORMAL /HPF (ref 0–5)

## 2022-06-20 PROCEDURE — 87186 SC STD MICRODIL/AGAR DIL: CPT

## 2022-06-20 PROCEDURE — 99284 EMERGENCY DEPT VISIT MOD MDM: CPT

## 2022-06-20 PROCEDURE — 87086 URINE CULTURE/COLONY COUNT: CPT

## 2022-06-20 PROCEDURE — 81025 URINE PREGNANCY TEST: CPT

## 2022-06-20 PROCEDURE — 99283 EMERGENCY DEPT VISIT LOW MDM: CPT

## 2022-06-20 PROCEDURE — 81001 URINALYSIS AUTO W/SCOPE: CPT

## 2022-06-20 RX ORDER — CEPHALEXIN 500 MG
1 CAPSULE ORAL
Qty: 28 | Refills: 0
Start: 2022-06-20 | End: 2022-06-26

## 2022-06-20 RX ORDER — CEPHALEXIN 500 MG
500 CAPSULE ORAL ONCE
Refills: 0 | Status: COMPLETED | OUTPATIENT
Start: 2022-06-20 | End: 2022-06-20

## 2022-06-20 RX ORDER — PHENAZOPYRIDINE HCL 100 MG
200 TABLET ORAL ONCE
Refills: 0 | Status: COMPLETED | OUTPATIENT
Start: 2022-06-20 | End: 2022-06-20

## 2022-06-20 RX ORDER — IBUPROFEN 200 MG
600 TABLET ORAL ONCE
Refills: 0 | Status: COMPLETED | OUTPATIENT
Start: 2022-06-20 | End: 2022-06-20

## 2022-06-20 RX ADMIN — Medication 600 MILLIGRAM(S): at 06:44

## 2022-06-20 RX ADMIN — Medication 600 MILLIGRAM(S): at 05:48

## 2022-06-20 RX ADMIN — Medication 200 MILLIGRAM(S): at 05:47

## 2022-06-20 RX ADMIN — Medication 500 MILLIGRAM(S): at 06:40

## 2022-06-20 NOTE — ED ADULT NURSE REASSESSMENT NOTE - NS ED NURSE REASSESS COMMENT FT1
Pt alert and oriented x 4, c/o of pain in the lower abd, bladder. History of UTI. Motrin given and Antibiotics. D/C papers given

## 2022-06-20 NOTE — ED PROVIDER NOTE - ATTENDING APP SHARED VISIT CONTRIBUTION OF CARE
I personally saw the patient with the PA, and completed the key components of the history and physical exam. I then discussed the management plan with the PA.    General: NAD, ENMT: Airway patent, mucous membranes moist, Cardiac: Normal rate, regular rhythm, Respiratory: breath sounds equal and clear bilaterally Abd: nt/nd, no CVAT.

## 2022-06-20 NOTE — ED PROVIDER NOTE - PATIENT PORTAL LINK FT
You can access the FollowMyHealth Patient Portal offered by [facility] by registering at the following website: [email or web address]. By joining [employer]’s FollowMyHealth portal, you will also be able to view your health information using other applications (apps) compatible with our system.

## 2022-06-20 NOTE — ED ADULT TRIAGE NOTE - CHIEF COMPLAINT QUOTE
Ambulatory reporting urinary retention related to dysuria (a lot of burning on urination). Denies N/V/D, fevers. States that this happened to her once before last year.

## 2022-06-20 NOTE — ED PROVIDER NOTE - OBJECTIVE STATEMENT
pt is a 47 y/o female presenting to the ed for evaluation. pt states has been experiencing dysuria for the past day. pt states has been experiencing hematuria associated. pt with hx of UTI in the past. pt denies cp sob fever nausea vomiting back pain numbness or loss of sensation headache neck pain visual changes

## 2022-06-20 NOTE — ED PROVIDER NOTE - NSFOLLOWUPINSTRUCTIONS_ED_ALL_ED_FT
[facility]                                                                                                                                                                  Infección urinaria en los adultos    Urinary Tract Infection, Adult       Sindy infección urinaria (IU) puede ocurrir en cualquier lugar de las vías urinarias. Las vías urinarias incluyen a los riñones, los uréteres, la vejiga y la uretra. Estos órganos fabrican, almacenan y eliminan la orina del organismo.    La IU milan afecta los uréteres y los riñones. La IU baja afecta la vejiga y la uretra.      ¿Cuáles son las causas?    La mayoría de las infecciones de las vías urinarias es causada por la presencia de bacterias en la smitha genital, alrededor de la uretra, por donde sale la orina del cuerpo. Estas bacterias proliferan y causan inflamación en las vías urinarias.      ¿Qué incrementa el riesgo?    Es más probable que sufra esta afección si:  •Tiene colocado un catéter urinario permanente.      •No puede controlar cuándo orinar o defecar (incontinencia).    •Es sejal y usted:  •Utiliza espermicida o diafragma rosalind método anticonceptivo.      •Tiene niveles bajos de estrógenos.      •Está embarazada.        •Tiene ciertos genes que aumentan ortiz riesgo.      •Es sexualmente activa.      •Nehal antibióticos.    •Tiene sindy afección que causa que el flujo de orina sea lento, rosalind:  •Próstata agrandada, si usted es hombre.      •Obstrucción de la uretra.      •Cálculo renal.      •Sindy afección nerviosa que afecta el control de la vejiga (vejiga neurógena).      •No britta lo suficiente o no orina con frecuencia.      •Tiene ciertas enfermedades crónicas, rosalind:  •Diabetes.      •Un sistema que combate las enfermedades (sistemainmunitario) debilitado.      •Anemia drepanocítica.      •Gota.      •Lesión en la médula kelly.          ¿Cuáles son los signos o síntomas?    Los síntomas de esta afección incluyen:  •Necesidad inmediata (urgencia) de orinar.      •Micción frecuente. [facility] puede incluir pequeñas cantidades de orina cada vez que orina.      •Ardor o dolor al orinar.      •Presencia de chris en la orina.      •Orina con mal olor u olor atípico.      •Dificultad para orinar.      •Orina turbia.      •Secreción vaginal, si es sejal.      •Dolor en el abdomen o en la parte inferior de la espalda.      Es posible que también tenga:  •Vómitos o disminución del apetito.      •Confusión.      •Irritabilidad o cansancio.      •Fiebre o escalofríos.      •Diarrea.      El primer síntoma en los adultos mayores puede ser la confusión. En algunos casos, es posible que no tengan síntomas hasta que la infección empeore.      ¿Cómo se diagnostica?    Esta afección se diagnostica en función de karie antecedentes médicos y de un examen físico. También pueden hacerle otras pruebas, incluidas las siguientes:  •Análisis de orina.      •Análisis de chris.      •Pruebas de infecciones de transmisión sexual (ITS).      Si ha tenido más de sindy infección urinaria (IU), se pueden hacer estudios de diagnóstico por imágenes o sindy cistoscopia para determinar la causa de las infecciones.      ¿Cómo se trata?    El tratamiento de esta afección incluye lo siguiente:  •Antibióticos.      •Medicamentos de venta mini para aliviar las molestias.      •Beber sindy cantidad suficiente agua para mantenerse hidratado.      Si tiene infecciones con frecuencia o tiene otras afecciones, rosalind un cálculo renal, es posible que deba dmitry a un médico especialista en las vías urinarias (urólogo).    En casos poco frecuentes, las infecciones urinarias pueden provocar sepsis. La sepsis es sindy afección potencialmente mortal que se produce cuando el cuerpo responde a sindy infección. La sepsis se trata en el hospital con antibióticos, líquidos y otros medicamentos que se administran por vía intravenosa.      Siga estas instrucciones en ortiz casa:     Medicamentos     •Use los medicamentos de venta mini y los recetados solamente rosalind se lo haya indicado el médico.      •Si le recetaron un antibiótico, tómelo rosalind se lo haya indicado el médico. No deje de usar el antibiótico aunque comience a sentirse mejor.      Instrucciones generales   •Asegúrese de hacer lo siguiente:  •Vaciar la vejiga con frecuencia y en ortiz totalidad. No contener la orina enma largos períodos.      •Vaciar la vejiga después de tener sexo.      •Limpiarse de atrás hacia adelante después de orinar o defecar, si es sejal. Usar cada trozo de papel higiénico solo sindy vez cuando se limpie.        •Beber suficiente líquido rosalind para mantener la orina de color amarillo pálido.      •Cumpla con todas las visitas de seguimiento. [facility] es importante.        Comuníquese con un médico si:    •Los síntomas no mejoran después de 1 o 2 días de tratamiento.      •Los síntomas desaparecen y luego vuelven a aparecer.        Solicite ayuda de inmediato si:    •Siente dolor intenso en la espalda o en la parte inferior del abdomen.      •Tiene fiebre o escalofríos.      •Tiene náuseas o vómitos.        Resumen    •Sindy infección urinaria (IU) es sindy infección en cualquier parte de las vías urinarias, que incluyen los riñones, los uréteres, la vejiga y la uretra.      •La mayoría de las infecciones de las vías urinarias es causada por bacterias en la smitha genital.      •El tratamiento de esta afección suele incluir antibióticos.      •Si le recetaron un antibiótico, tómelo rosalind se lo haya indicado el médico. No deje de usar el antibiótico aunque comience a sentirse mejor.      •Cumpla con todas las visitas de seguimiento. [facility] es importante.      Esta información no tiene rosalind fin reemplazar el consejo del médico. Asegúrese de hacerle al médico cualquier pregunta que tenga.      Document Revised: 10/11/2021 Document Reviewed: 10/11/2021    [employer] Patient Education © 2022 [employer]

## 2022-06-27 NOTE — ED PROVIDER NOTE - HISTORY ATTESTATION, MLM
Vss. Pt resting in bed. Pt on ra with 02 sats wnl. Lungs cta. Pt denies difficulty breathing or sob. Denies chest pain. Denies n/t. Anterior neck dressing c/d/i. Pt dtv on floor. Reports moderate pain to neck, will give pain medication. Pt denies n/v. Will start diet per md order. ivf infusing, site intact. Dr. Rogelio Cr notified of new consult. poc updated with pt and wife and both show understanding. Will monitor. I have reviewed and confirmed nurses' notes...

## 2022-08-12 ENCOUNTER — EMERGENCY (EMERGENCY)
Facility: HOSPITAL | Age: 47
LOS: 1 days | Discharge: HOME | End: 2022-08-12
Attending: EMERGENCY MEDICINE
Payer: COMMERCIAL

## 2022-08-12 VITALS
SYSTOLIC BLOOD PRESSURE: 101 MMHG | OXYGEN SATURATION: 97 % | WEIGHT: 171.96 LBS | TEMPERATURE: 98 F | HEART RATE: 68 BPM | DIASTOLIC BLOOD PRESSURE: 63 MMHG | RESPIRATION RATE: 18 BRPM | HEIGHT: 62 IN

## 2022-08-12 DIAGNOSIS — Z98.51 TUBAL LIGATION STATUS: Chronic | ICD-10-CM

## 2022-08-12 DIAGNOSIS — Z98.890 OTHER SPECIFIED POSTPROCEDURAL STATES: Chronic | ICD-10-CM

## 2022-08-12 LAB
ALBUMIN SERPL ELPH-MCNC: 3.8 G/DL — SIGNIFICANT CHANGE UP (ref 3.3–5.2)
ALP SERPL-CCNC: 149 U/L — HIGH (ref 40–120)
ALT FLD-CCNC: 48 U/L — HIGH
ANION GAP SERPL CALC-SCNC: 10 MMOL/L — SIGNIFICANT CHANGE UP (ref 5–17)
APPEARANCE UR: CLEAR — SIGNIFICANT CHANGE UP
AST SERPL-CCNC: 17 U/L — SIGNIFICANT CHANGE UP
BASOPHILS # BLD AUTO: 0.02 K/UL — SIGNIFICANT CHANGE UP (ref 0–0.2)
BASOPHILS NFR BLD AUTO: 0.2 % — SIGNIFICANT CHANGE UP (ref 0–2)
BILIRUB SERPL-MCNC: 0.3 MG/DL — LOW (ref 0.4–2)
BILIRUB UR-MCNC: NEGATIVE — SIGNIFICANT CHANGE UP
BUN SERPL-MCNC: 19.5 MG/DL — SIGNIFICANT CHANGE UP (ref 8–20)
CALCIUM SERPL-MCNC: 8.6 MG/DL — SIGNIFICANT CHANGE UP (ref 8.4–10.5)
CHLORIDE SERPL-SCNC: 101 MMOL/L — SIGNIFICANT CHANGE UP (ref 98–107)
CO2 SERPL-SCNC: 25 MMOL/L — SIGNIFICANT CHANGE UP (ref 22–29)
COLOR SPEC: YELLOW — SIGNIFICANT CHANGE UP
CREAT SERPL-MCNC: 0.58 MG/DL — SIGNIFICANT CHANGE UP (ref 0.5–1.3)
DIFF PNL FLD: ABNORMAL
EGFR: 112 ML/MIN/1.73M2 — SIGNIFICANT CHANGE UP
EOSINOPHIL # BLD AUTO: 0.01 K/UL — SIGNIFICANT CHANGE UP (ref 0–0.5)
EOSINOPHIL NFR BLD AUTO: 0.1 % — SIGNIFICANT CHANGE UP (ref 0–6)
EPI CELLS # UR: SIGNIFICANT CHANGE UP
GLUCOSE SERPL-MCNC: 131 MG/DL — HIGH (ref 70–99)
GLUCOSE UR QL: NEGATIVE MG/DL — SIGNIFICANT CHANGE UP
HCG SERPL-ACNC: <4 MIU/ML — SIGNIFICANT CHANGE UP
HCT VFR BLD CALC: 37.7 % — SIGNIFICANT CHANGE UP (ref 34.5–45)
HGB BLD-MCNC: 12.8 G/DL — SIGNIFICANT CHANGE UP (ref 11.5–15.5)
IMM GRANULOCYTES NFR BLD AUTO: 1.8 % — HIGH (ref 0–1.5)
KETONES UR-MCNC: NEGATIVE — SIGNIFICANT CHANGE UP
LACTATE BLDV-MCNC: 1.3 MMOL/L — SIGNIFICANT CHANGE UP (ref 0.5–2)
LEUKOCYTE ESTERASE UR-ACNC: NEGATIVE — SIGNIFICANT CHANGE UP
LIDOCAIN IGE QN: 39 U/L — SIGNIFICANT CHANGE UP (ref 22–51)
LYMPHOCYTES # BLD AUTO: 1.77 K/UL — SIGNIFICANT CHANGE UP (ref 1–3.3)
LYMPHOCYTES # BLD AUTO: 21.3 % — SIGNIFICANT CHANGE UP (ref 13–44)
MCHC RBC-ENTMCNC: 31.1 PG — SIGNIFICANT CHANGE UP (ref 27–34)
MCHC RBC-ENTMCNC: 34 GM/DL — SIGNIFICANT CHANGE UP (ref 32–36)
MCV RBC AUTO: 91.5 FL — SIGNIFICANT CHANGE UP (ref 80–100)
MONOCYTES # BLD AUTO: 0.59 K/UL — SIGNIFICANT CHANGE UP (ref 0–0.9)
MONOCYTES NFR BLD AUTO: 7.1 % — SIGNIFICANT CHANGE UP (ref 2–14)
NEUTROPHILS # BLD AUTO: 5.78 K/UL — SIGNIFICANT CHANGE UP (ref 1.8–7.4)
NEUTROPHILS NFR BLD AUTO: 69.5 % — SIGNIFICANT CHANGE UP (ref 43–77)
NITRITE UR-MCNC: NEGATIVE — SIGNIFICANT CHANGE UP
PH UR: 6.5 — SIGNIFICANT CHANGE UP (ref 5–8)
PLATELET # BLD AUTO: 252 K/UL — SIGNIFICANT CHANGE UP (ref 150–400)
POTASSIUM SERPL-MCNC: 4 MMOL/L — SIGNIFICANT CHANGE UP (ref 3.5–5.3)
POTASSIUM SERPL-SCNC: 4 MMOL/L — SIGNIFICANT CHANGE UP (ref 3.5–5.3)
PROT SERPL-MCNC: 6.3 G/DL — LOW (ref 6.6–8.7)
PROT UR-MCNC: NEGATIVE — SIGNIFICANT CHANGE UP
RBC # BLD: 4.12 M/UL — SIGNIFICANT CHANGE UP (ref 3.8–5.2)
RBC # FLD: 12.6 % — SIGNIFICANT CHANGE UP (ref 10.3–14.5)
RBC CASTS # UR COMP ASSIST: SIGNIFICANT CHANGE UP /HPF (ref 0–4)
SODIUM SERPL-SCNC: 136 MMOL/L — SIGNIFICANT CHANGE UP (ref 135–145)
SP GR SPEC: 1.01 — SIGNIFICANT CHANGE UP (ref 1.01–1.02)
UROBILINOGEN FLD QL: NEGATIVE MG/DL — SIGNIFICANT CHANGE UP
WBC # BLD: 8.32 K/UL — SIGNIFICANT CHANGE UP (ref 3.8–10.5)
WBC # FLD AUTO: 8.32 K/UL — SIGNIFICANT CHANGE UP (ref 3.8–10.5)
WBC UR QL: NEGATIVE /HPF — SIGNIFICANT CHANGE UP (ref 0–5)

## 2022-08-12 PROCEDURE — 84702 CHORIONIC GONADOTROPIN TEST: CPT

## 2022-08-12 PROCEDURE — 96374 THER/PROPH/DIAG INJ IV PUSH: CPT | Mod: XU

## 2022-08-12 PROCEDURE — 96375 TX/PRO/DX INJ NEW DRUG ADDON: CPT

## 2022-08-12 PROCEDURE — 36415 COLL VENOUS BLD VENIPUNCTURE: CPT

## 2022-08-12 PROCEDURE — 81001 URINALYSIS AUTO W/SCOPE: CPT

## 2022-08-12 PROCEDURE — 85025 COMPLETE CBC W/AUTO DIFF WBC: CPT

## 2022-08-12 PROCEDURE — 99285 EMERGENCY DEPT VISIT HI MDM: CPT

## 2022-08-12 PROCEDURE — 74177 CT ABD & PELVIS W/CONTRAST: CPT | Mod: 26,MA

## 2022-08-12 PROCEDURE — 74177 CT ABD & PELVIS W/CONTRAST: CPT | Mod: MA

## 2022-08-12 PROCEDURE — 83605 ASSAY OF LACTIC ACID: CPT

## 2022-08-12 PROCEDURE — 99284 EMERGENCY DEPT VISIT MOD MDM: CPT | Mod: 25

## 2022-08-12 PROCEDURE — 80053 COMPREHEN METABOLIC PANEL: CPT

## 2022-08-12 PROCEDURE — 83690 ASSAY OF LIPASE: CPT

## 2022-08-12 RX ORDER — METOCLOPRAMIDE HCL 10 MG
10 TABLET ORAL ONCE
Refills: 0 | Status: COMPLETED | OUTPATIENT
Start: 2022-08-12 | End: 2022-08-12

## 2022-08-12 RX ORDER — SODIUM CHLORIDE 9 MG/ML
1000 INJECTION INTRAMUSCULAR; INTRAVENOUS; SUBCUTANEOUS ONCE
Refills: 0 | Status: COMPLETED | OUTPATIENT
Start: 2022-08-12 | End: 2022-08-12

## 2022-08-12 RX ORDER — FAMOTIDINE 10 MG/ML
20 INJECTION INTRAVENOUS ONCE
Refills: 0 | Status: COMPLETED | OUTPATIENT
Start: 2022-08-12 | End: 2022-08-12

## 2022-08-12 RX ORDER — MORPHINE SULFATE 50 MG/1
4 CAPSULE, EXTENDED RELEASE ORAL ONCE
Refills: 0 | Status: DISCONTINUED | OUTPATIENT
Start: 2022-08-12 | End: 2022-08-12

## 2022-08-12 RX ORDER — PANTOPRAZOLE SODIUM 20 MG/1
1 TABLET, DELAYED RELEASE ORAL
Qty: 30 | Refills: 0
Start: 2022-08-12 | End: 2022-09-10

## 2022-08-12 RX ORDER — ONDANSETRON 8 MG/1
4 TABLET, FILM COATED ORAL ONCE
Refills: 0 | Status: COMPLETED | OUTPATIENT
Start: 2022-08-12 | End: 2022-08-12

## 2022-08-12 RX ADMIN — FAMOTIDINE 20 MILLIGRAM(S): 10 INJECTION INTRAVENOUS at 19:37

## 2022-08-12 RX ADMIN — MORPHINE SULFATE 4 MILLIGRAM(S): 50 CAPSULE, EXTENDED RELEASE ORAL at 18:09

## 2022-08-12 RX ADMIN — ONDANSETRON 4 MILLIGRAM(S): 8 TABLET, FILM COATED ORAL at 18:08

## 2022-08-12 RX ADMIN — Medication 10 MILLIGRAM(S): at 19:37

## 2022-08-12 RX ADMIN — SODIUM CHLORIDE 1000 MILLILITER(S): 9 INJECTION INTRAMUSCULAR; INTRAVENOUS; SUBCUTANEOUS at 18:09

## 2022-08-12 NOTE — ED STATDOCS - PATIENT PORTAL LINK FT
You can access the FollowMyHealth Patient Portal offered by [facility] by registering at the following website: [email or web address]. By joining [employer]’s FollowMyHealth portal, you will also be able to view your health information using other applications (apps) compatible with our system.

## 2022-08-12 NOTE — ED ADULT NURSE REASSESSMENT NOTE - NS ED NURSE REASSESS COMMENT FT1
Pt arrives to ED c/o upper abd pain associated with nausea for two days - pt seen and eval by provider , medicated as per MD orders - awaiting CT - will continue to monitor

## 2022-08-12 NOTE — ED STATDOCS - PROGRESS NOTE DETAILS
pt needs emergent imaging fo the ct. denies pregnancy on known renal problems. Can do CT scan prior to her results as the risk of missing intraabdominal bleed higher than HTN risk from contrast. pt needs emergent imaging of the ct. denies pregnancy on known renal problems. Can do CT scan prior to her results as the risk of missing intraabdominal bleed higher than risk from contrast. MANAN Rubio: labs and ct reviewed with tamir. pt re-assessed, feeling much better. Hx similar symptoms, has been diagnosed with gastritis in past but does not take medications currently. Abd soft/non-tender. tolerating PO intake. will start patient on protonix and advised to f/u with GI doctor. Educated on diet modifications for symptoms. return precautions discussed  : Kevin

## 2022-08-12 NOTE — ED STATDOCS - GASTROINTESTINAL, MLM
abdomen non soft, distended, epigastric pain abdomen firm and, distended, epigastric pain  but no tenderness

## 2022-08-12 NOTE — ED STATDOCS - NSFOLLOWUPINSTRUCTIONS_ED_ALL_ED_FT
- Return to the ED for any new or worsening symptoms.   - Take protonix daily as directed  - Follow-up with GI doctor for further evaluation of symptoms    Gastritis    Gastritis is soreness and swelling (inflammation) of the lining of the stomach. Gastritis can develop as a sudden onset (acute) or long-term (chronic) condition. If gastritis is not treated, it can lead to stomach bleeding and ulcers. Causes include viral and bacterial infections, excessive alcohol consumption, tobacco use, or certain medications. Symptoms include nausea, vomiting, or abdominal pain or burning especially after eating. Avoid foods or drinks that make your symptoms worse such as caffeine, chocolate, spicy foods, acidic foods, or alcohol.    SEEK IMMEDIATE MEDICAL CARE IF YOU HAVE ANY OF THE FOLLOWING SYMPTOMS: black or bloody stools, blood or coffee-ground-colored vomitus, worsening abdominal pain, fever, or inability to keep fluids down.     Abdominal Pain    Many things can cause abdominal pain. Many times, abdominal pain is not caused by a disease and will improve without treatment. Your health care provider will do a physical exam to determine if there is a dangerous cause of your pain; blood tests and imaging may help determine the cause of your pain. However, in many cases, no cause may be found and you may need further testing as an outpatient. Monitor your abdominal pain for any changes.     SEEK IMMEDIATE MEDICAL CARE IF YOU HAVE ANY OF THE FOLLOWING SYMPTOMS: worsening abdominal pain, uncontrollable vomiting, profuse diarrhea, inability to have bowel movements or pass gas, black or bloody stools, fever accompanying chest pain or back pain, or fainting. These symptoms may represent a serious problem that is an emergency. Do not wait to see if the symptoms will go away. Get medical help right away. Call 911 and do not drive yourself to the hospital.     - Regrese al servicio de urgencias por cualquier síntoma nuevo o que empeore.  - [place] protonix diariamente según las indicaciones  - Seguimiento con un médico gastrointestinal para teddy evaluación adicional de los síntomas.    Gastritis    La gastritis es el dolor y la hinchazón (inflamación) del revestimiento del estómago. La gastritis puede desarrollarse rosalind teddy afección de aparición repentina (aguda) o prolongada (crónica). Si la gastritis no se trata, puede provocar sangrado estomacal y úlceras. Las causas incluyen infecciones virales y bacterianas, consumo excesivo de alcohol, tabaquismo o ciertos medicamentos. Los síntomas incluyen náuseas, vómitos o dolor abdominal o ardor, especialmente después de comer. Evite alimentos o bebidas que empeoren karie síntomas, rosalind la cafeína, el chocolate, los alimentos picantes, los alimentos ácidos o el alcohol.    BUSQUE ATENCIÓN MÉDICA INMEDIATA SI TIENE ALGUNO DE LOS SIGUIENTES SÍNTOMAS: heces negras o con chris, chris o vómitos de color café molido, empeoramiento del dolor abdominal, fiebre o incapacidad para retener líquidos.    Dolor abdominal    Muchas cosas pueden causar dolor abdominal. Muchas veces, el dolor abdominal no es causado por teddy enfermedad y mejorará sin tratamiento. Ortiz proveedor de atención médica le hará un examen físico para determinar si existe teddy causa peligrosa de ortiz dolor; Los análisis de chris y las imágenes pueden ayudar a determinar la causa de ortiz dolor. Sin embargo, en muchos casos, es posible que no se encuentre la causa y es posible que necesite más pruebas rosalind paciente ambulatorio. Supervise ortiz dolor abdominal para detectar cualquier cambio.    BUSQUE ATENCIÓN MÉDICA INMEDIATA SI TIENE ALGUNO DE LOS SIGUIENTES SÍNTOMAS: empeoramiento del dolor abdominal, vómitos incontrolables, diarrea profusa, incapacidad para defecar o expulsar gases, heces negras o con chris, fiebre que acompaña al dolor de pecho o de espalda, o desmayo. Estos síntomas pueden representar un problema grave que es teddy emergencia. No espere a dmitry si los síntomas desaparecen. Obtenga ayuda médica de inmediato. Llame al 911 y no conduzca hasta el hospital.

## 2022-08-12 NOTE — ED STATDOCS - OBJECTIVE STATEMENT
47 y/o F with PMHx UTI, tubal ligation, hernia repair presents to the ED c/o abdominal pain. Pt reports epigastric pain and bloating. Pt reports she's unable to get air out. Pt reports bloating x few hours. Denies pregnancy.  : Kevin

## 2022-08-12 NOTE — ED STATDOCS - CARE PROVIDER_API CALL
Eric Reed)  Gastroenterology; Internal Medicine  [address] [facility], [facility] [address]  [place] [address]  Phone: [phone]  Fax: [phone]  Follow Up Time:

## 2022-11-01 ENCOUNTER — EMERGENCY (EMERGENCY)
Facility: HOSPITAL | Age: 47
LOS: 1 days | Discharge: HOME | End: 2022-11-01
Attending: EMERGENCY MEDICINE
Payer: COMMERCIAL

## 2022-11-01 VITALS
OXYGEN SATURATION: 96 % | HEIGHT: 61 IN | TEMPERATURE: 98 F | SYSTOLIC BLOOD PRESSURE: 112 MMHG | RESPIRATION RATE: 15 BRPM | HEART RATE: 68 BPM | WEIGHT: 162.92 LBS | DIASTOLIC BLOOD PRESSURE: 73 MMHG

## 2022-11-01 DIAGNOSIS — Z98.890 OTHER SPECIFIED POSTPROCEDURAL STATES: Chronic | ICD-10-CM

## 2022-11-01 DIAGNOSIS — Z98.51 TUBAL LIGATION STATUS: Chronic | ICD-10-CM

## 2022-11-01 PROCEDURE — 90715 TDAP VACCINE 7 YRS/> IM: CPT

## 2022-11-01 PROCEDURE — 73130 X-RAY EXAM OF HAND: CPT | Mod: 26,RT

## 2022-11-01 PROCEDURE — 99283 EMERGENCY DEPT VISIT LOW MDM: CPT | Mod: 25

## 2022-11-01 PROCEDURE — 73130 X-RAY EXAM OF HAND: CPT

## 2022-11-01 PROCEDURE — 99283 EMERGENCY DEPT VISIT LOW MDM: CPT

## 2022-11-01 PROCEDURE — 90471 IMMUNIZATION ADMIN: CPT

## 2022-11-01 RX ORDER — TETANUS TOXOID, REDUCED DIPHTHERIA TOXOID AND ACELLULAR PERTUSSIS VACCINE, ADSORBED 5; 2.5; 8; 8; 2.5 [IU]/.5ML; [IU]/.5ML; UG/.5ML; UG/.5ML; UG/.5ML
0.5 SUSPENSION INTRAMUSCULAR ONCE
Refills: 0 | Status: COMPLETED | OUTPATIENT
Start: 2022-11-01 | End: 2022-11-01

## 2022-11-01 RX ORDER — OXYCODONE AND ACETAMINOPHEN 5; 325 MG/1; MG/1
1 TABLET ORAL ONCE
Refills: 0 | Status: DISCONTINUED | OUTPATIENT
Start: 2022-11-01 | End: 2022-11-01

## 2022-11-01 RX ADMIN — TETANUS TOXOID, REDUCED DIPHTHERIA TOXOID AND ACELLULAR PERTUSSIS VACCINE, ADSORBED 0.5 MILLILITER(S): 5; 2.5; 8; 8; 2.5 SUSPENSION INTRAMUSCULAR at 20:46

## 2022-11-01 RX ADMIN — OXYCODONE AND ACETAMINOPHEN 1 TABLET(S): 5; 325 TABLET ORAL at 20:46

## 2022-11-01 NOTE — ED PROVIDER NOTE - PATIENT PORTAL LINK FT
You can access the FollowMyHealth Patient Portal offered by [facility] by registering at the following website: [email or web address]. By joining [employer]’s FollowMyHealth portal, you will also be able to view your health information using other applications (apps) compatible with our system.

## 2022-11-01 NOTE — ED ADULT TRIAGE NOTE - CHIEF COMPLAINT QUOTE
R middle finger injury - states crushed finger between a machine at work.  + lac noted to nailbed with limited ROM.  Pt denies blood thinners/PMHx.

## 2022-11-01 NOTE — ED PROVIDER NOTE - CARE PLAN
Pt hourly rounding competed. Safety   Pt (x) resting on stretcher with side rails up and call bell in reach. () in chair    () in parents arms. Toileting   Pt offered ()Bedpan     ()Assistance to Restroom     ()Urinal  Ongoing Updates  Updated on plan of care and status of test results.   Pain Management  Inquired as to comfort and offered comfort measures:    () warm blankets   () dimmed lights  Pt eating a dinner meal.  IV site intact and dry, no signs of infiltration, redness or swelling 1 Principal Discharge DX:	Finger injury

## 2022-11-01 NOTE — ED PROVIDER NOTE - CARE PROVIDER_API CALL
Asad Okeefe (DO)  Orthopaedic Surgery  [address] [place] [address]  Phone: [phone]  Fax: [phone]  Follow Up Time:

## 2022-11-01 NOTE — ED ADULT NURSE NOTE - OBJECTIVE STATEMENT
PT a&ox4 complains of right hand finger injury, that happened at work when a machine crushed her finger. Pt's respirations even and unlabored, VSS, no distress noted.

## 2022-11-01 NOTE — ED PROVIDER NOTE - OBJECTIVE STATEMENT
46 year old female with no med hx presented to ED c/o hand injury. 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 she admits to pain, swelling. denies other injury, numbness, tingling

## 2022-11-01 NOTE — ED PROVIDER NOTE - NSFOLLOWUPINSTRUCTIONS_ED_ALL_ED_FT
Follow up with hand within 1 week   Follow up with PCP within 1 week     return if new or worsening symptoms

## 2022-11-01 NOTE — ED PROVIDER NOTE - PHYSICAL EXAMINATION
+ 3rd fingernail lac, no subungal, + swelling to finger no wrist tenderness/swelling FROM + 3rd fingernail lac, no subungual, + swelling to finger no wrist tenderness/swelling FROM

## 2022-11-07 NOTE — ED STATDOCS - DISPOSITION TYPE
[facility] Progress Note      Patient Name: Miguelina Lopez  : 1945  MRN: [id number]  Primary Care Physician:  Arcelia Talbot APRN  Date of admission: 2022    Subjective     Interval History:   Follow-up acute kidney injury on chronic kidney disease    Continue to have cough, her dyspnea is improving, no chest pain, no nausea or vomiting, no abdominal pain, no dysuria or gross hematuria.    Review of Systems:   As noted above    Objective     Vitals:   Temp:  [98.5 °F (36.9 °C)-98.9 °F (37.2 °C)] 98.5 °F (36.9 °C)  Heart Rate:  [75-79] 79  Resp:  [16-20] 18  BP: (104-113)/(43-54) 112/49  Flow (L/min):  [2-3] 2    Intake/Output Summary (Last 24 hours) at 2022 0907  Last data filed at 2022 0528  Gross per 24 hour   Intake 360 ml   Output 1400 ml   Net -1040 ml       Physical Exam:    General Appearance: alert, awake, no acute distress, morbidly obese and chronically  Skin: warm and dry  HEENT: oral mucosa normal, nonicteric sclera  Neck: Mild JVD  Lungs: Bilateral rhonchi and crackles, breathing effort not labored  Heart: RRR, normal S1 and S2, no rub  Abdomen: soft, nontender, nondistended, normoactive bowel  : no palpable bladder pure wick system is in place  Extremities: 1+ lower extremity edema.  Neuro: normal speech and mental status     Scheduled Meds:     allopurinol, 100 mg, Oral, Daily  atorvastatin, 40 mg, Oral, Daily  benzonatate, 100 mg, Oral, TID  budesonide-formoterol, 2 puff, Inhalation, BID - RT  carvedilol, 12.5 mg, Oral, BID With Meals  dextromethorphan polistirex ER, 60 mg, Oral, Q12H  enoxaparin, 30 mg, Subcutaneous, Q24H  ferrous sulfate, 325 mg, Oral, Daily With Breakfast  fluticasone, 2 spray, Each Nare, Daily  gabapentin, 100 mg, Oral, BID  guaiFENesin, 1,200 mg, Oral, Q12H  insulin lispro, 0-14 Units, Subcutaneous, TID AC  lactobacillus acidophilus, 1 capsule, Oral, Daily  levothyroxine, 100 mcg, Oral, Q AM  lidocaine, 1 patch,  Transdermal, Q24H  pantoprazole, 40 mg, Oral, QAM  potassium chloride, 20 mEq, Oral, Daily  sennosides-docusate, 1 tablet, Oral, Every Other Day  sodium chloride, 2 spray, Each Nare, TID  vilazodone, 20 mg, Oral, Nightly      IV Meds:        Results Reviewed:   I have personally reviewed the results from the time of this admission to 11/7/2022 09:07 EST     Results from last 7 days   Lab Units 11/07/22 0404 11/06/22 0437 11/05/22 0524 11/02/22 0429 11/01/22  1746   SODIUM mmol/L 137 139 139   < > 135*   POTASSIUM mmol/L 4.4 3.8 3.8   < > 4.4   CHLORIDE mmol/L 94* 97* 96*   < > 92*   CO2 mmol/L 33.3* 31.7* 32.5*   < > 28.6   BUN mg/dL 80* 77* 67*   < > 71*   CREATININE mg/dL 2.92* 2.72* 2.39*   < > 2.40*   CALCIUM mg/dL 8.4* 8.6 8.4*   < > 8.7   BILIRUBIN mg/dL  --   --   --   --  0.5   ALK PHOS U/L  --   --   --   --  171*   ALT (SGPT) U/L  --   --   --   --  11   AST (SGOT) U/L  --   --   --   --  18   GLUCOSE mg/dL 160* 127* 142*   < > 467*    < > = values in this interval not displayed.       Estimated Creatinine Clearance: 19.8 mL/min (A) (by C-G formula based on SCr of 2.92 mg/dL (H)).    Results from last 7 days   Lab Units 11/07/22 0404 11/06/22 0437 11/05/22 0524 11/04/22 0443 11/03/22  0421   MAGNESIUM mg/dL  --   --  1.9 1.9 1.8   PHOSPHORUS mg/dL 4.2 3.8 3.7 3.3 3.3       Results from last 7 days   Lab Units 11/05/22 0524 11/04/22 0443 11/03/22 0421   URIC ACID mg/dL 11.3* 10.7* 11.3*       Results from last 7 days   Lab Units 11/04/22  0443 11/03/22  0421 11/02/22  0429 11/01/22  1746   WBC 10*3/mm3 6.82 7.57 7.77 8.25   HEMOGLOBIN g/dL 9.0* 8.6* 8.6* 8.7*   PLATELETS 10*3/mm3 179 171 182 199             Assessment / Plan     ASSESSMENT:  -Acute kidney injury on chronic kidney disease, associated with diabetic and hypertensive glomerulosclerosis, creatinine increased to 2.92 Mg/DL with diuresis  -Fluid overload and congestive heart failure, improved but not resolved  -Coronary artery disease  with prior CABG  -Aortic stenosis prior TAVR  -Prior mitral valve replacement tricuspid valve repair  -COPD with pulmonary hypertension  -Morbid obesity  -Chronic anemia, hemoglobin on 11/4/2022 was 9, patient has evidence of iron deficiency as noted in July 2022.  Iron stores revealed iron saturation 10% and the ferritin 167.  Patient receiving 3 doses of IV iron  -History of gout, treated with allopurinol and appears to be stable,    PLAN:  -Restart diuretics, torsemide 40 mg twice daily  -Surveillance labs  I discussed the case with Dr. Mauro    Thank you for involving us in the care of Miguelina WARE Kaylenalok.  Please feel free to call with any questions.    Dionicio Krishna MD  11/07/22  09:07 [facility]    [facility]  [phone]      Much of this encounter note is an electronic transcription/translation of spoken language to printed text. The electronic translation of spoken language may permit erroneous, or at times, nonsensical words or phrases to be inadvertently transcribed; Although I have reviewed the note for such errors, some may still exist.            DISCHARGE

## 2022-12-21 ENCOUNTER — EMERGENCY (EMERGENCY)
Facility: HOSPITAL | Age: 47
LOS: 1 days | Discharge: HOME | End: 2022-12-21
Attending: EMERGENCY MEDICINE
Payer: COMMERCIAL

## 2022-12-21 VITALS
HEIGHT: 61 IN | TEMPERATURE: 98 F | DIASTOLIC BLOOD PRESSURE: 60 MMHG | WEIGHT: 160.06 LBS | SYSTOLIC BLOOD PRESSURE: 99 MMHG | HEART RATE: 71 BPM | RESPIRATION RATE: 18 BRPM | OXYGEN SATURATION: 97 %

## 2022-12-21 DIAGNOSIS — Z98.890 OTHER SPECIFIED POSTPROCEDURAL STATES: Chronic | ICD-10-CM

## 2022-12-21 DIAGNOSIS — Z98.51 TUBAL LIGATION STATUS: Chronic | ICD-10-CM

## 2022-12-21 LAB
RAPID RVP RESULT: SIGNIFICANT CHANGE UP
SARS-COV-2 RNA SPEC QL NAA+PROBE: SIGNIFICANT CHANGE UP

## 2022-12-21 PROCEDURE — 0225U NFCT DS DNA&RNA 21 SARSCOV2: CPT

## 2022-12-21 PROCEDURE — 99283 EMERGENCY DEPT VISIT LOW MDM: CPT

## 2022-12-21 PROCEDURE — 71046 X-RAY EXAM CHEST 2 VIEWS: CPT | Mod: 26

## 2022-12-21 PROCEDURE — 99284 EMERGENCY DEPT VISIT MOD MDM: CPT

## 2022-12-21 PROCEDURE — 71046 X-RAY EXAM CHEST 2 VIEWS: CPT

## 2022-12-21 NOTE — ED STATDOCS - NS_ ATTENDINGSCRIBEDETAILS _ED_A_ED_FT
I, Vignesh Khan, performed the initial face to face bedside interview with this patient regarding history of present illness, review of symptoms and relevant past medical, social and family history.  I completed an independent physical examination.  I was the initial provider who evaluated this patient. I have signed out the follow up of any pending tests (i.e. labs, radiological studies) to the ACP.  I have communicated the patient’s plan of care and disposition with the ACP.  The history, relevant review of systems, past medical and surgical history, medical decision making, and physical examination was documented by the scribe in my presence and I attest to the accuracy of the documentation.

## 2022-12-21 NOTE — ED STATDOCS - PATIENT PORTAL LINK FT
You can access the FollowMyHealth Patient Portal offered by [facility] by registering at the following website: [email or web address]. By joining [employer]’s FollowMyHealth portal, you will also be able to view your health information using other applications (apps) compatible with our system.

## 2022-12-21 NOTE — ED STATDOCS - PROGRESS NOTE DETAILS
RVP and CXR negative, reviewed results with pt. recommended OTC antihistamines and to see allergist. educated on supportive care measures. pt verbalizes understanding

## 2022-12-21 NOTE — ED STATDOCS - OBJECTIVE STATEMENT
46 y/o female with no PMHx presents to ED c/o flu-like symptoms. Patient reports productive cough, chest pain, left ear pain and back pain x3 weeks. Has not seen her doctor, or been tested for COVID.     Denies N/V/D, fever. chills

## 2023-01-01 NOTE — ED ADULT NURSE NOTE - OBJECTIVE STATEMENT
(1) flexion of extremities
44 F, NAD, c/o right chest pain radiatingto back thatincreases with inspiration x 4 days, denies fevers, denies recent travel, denies known sick contacts.  called to bedside.

## 2023-01-10 NOTE — ED PROVIDER NOTE - GENITOURINARY [+], MLM
Do You Have Any Concerning Skin Lesions Today?  If No, You Do Not Have To Fill Out The Remainder Of The Questions.: yes Where Is Your Skin Lesion(S) Of Concern Located?: Left temple, back dysuria and increase voiding

## 2023-01-30 NOTE — ED ADULT NURSE NOTE - CAS EDP DISCH TYPE
Home Mohs Histo Method Verbiage: Each section was then chromacoded and processed in the Mohs lab using the Mohs protocol and submitted for frozen section.

## 2023-04-19 NOTE — ED PROVIDER NOTE - NSICDXPASTMEDICALHX_GEN_ALL_CORE_FT
Date of Service: 04/19/2023    PULMONARY CONSULT     A complicated 60-year-old -American female with a history of moderate to severe sleep apnea (currently untreated, to be set up on CPAP next week); chronic ventilatory insufficiency; asthmatic bronchitis, readmitted with altered mental status/lethargy.  CT scan head personally reviewed reveals mild chronic microvascular changes, no evidence of acute intracranial abnormalities.  Venous blood gas reveals pCO2 of 45.  She did have dialysis yesterday.  They felt that she may have some metabolic encephalopathy.  Denies any prodromal symptoms of infection.  I am asked to see her at this time regarding her underlying pulmonary/sleep status.    PAST MEDICAL HISTORY:    1.  End-stage renal disease, on hemodialysis.  2.  Severe sleep-disordered breathing with AHI 41, oxygen desaturation to 84% based on updated requalifying polysomnogram 03/28/2023.  CPAP of 10 cm water pressure with 3 liter per minute oxygen bleed in dropped her AHI to 3 and maintained saturations above 90%.  She is to be set up on home auto CPAP 10-16 cm of water pressure in 1 week through [facility].  3.  Chronic ventilatory insufficiency, baseline pCO2 approximately 50.  4.  Morbid obesity class 3, BMI 46.1.  5.  Pelvic abscess 05/2022 (rare Pseudomonas aeruginosa) treated with prolonged antibiotics.  6.  Asthmatic bronchitis.  7.  Hypertension.  8.  Type 2 diabetes, insulin-dependent.  9.  Moderate restrictive lung disease (TLC 60%) due to large body habitus.  10.  Mild aortic stenosis.  11.  Congestive heart failure.  12.  Breast cancer, status post right mastectomy in 2012.  13.  Malignant brain tumor, status post chemotherapy/radiation in 1993.  14.  DVT.  15.  Diabetic gastroparesis.  16.  Restless leg syndrome.  17.  Chronic cough (multifactorial).    CURRENT MEDICATIONS:    Reviewed per Epic.    Sensipar 30 mg 3 times a week.   Clopidogrel 75 mg q.h.s.  Heparin 5000 units subQ q.8h.    Metoclopramide 5 mg q.i.d.  Metoprolol  mg daily.  Montelukast 10 mg daily.  Pantoprazole 40 mg b.i.d.  Ropinirole 1.5 mg q.h.s.    ALLERGIES:    Multiple including Hydrocodone, Penicillin, Bactrim, and Levofloxacin.    SOCIAL HISTORY:    The patient is .  has not been ill.  She is a lifelong nonsmoker.  Disabled.  Weight is 277 pounds.  Full code 4 status.    CT scan head 04/18/2023 personally reviewed and discussed above.    Echocardiogram 01/19/2023: EF 71%.  Trace MR.  Normal RV.      PFTs 04/04/2022: Moderate restrictive impairment (TLC 60% or 2.88 liters).  No evidence of obstruction or airway reactivity.  Unable to perform DLCO.    LABORATORY VALUES:    Venous blood gas 04/18/2023: pCO2 of 45.  WBC 7.2, hemoglobin 10.2, hematocrit 33.2, platelets 264.  Sodium 136, potassium 3.4, chloride 104, total CO2 of 21, BUN 38, creatinine 11.3. TSH 03/02/2023: 3.788, within normal limits.    PHYSICAL EXAMINATION:    GENERAL:  Reveals a somewhat somnolent, but easily arousable and cooperative -American female.  HEENT:  Prather palate position 4.  NECK:  Thick, short neck.  No lymphadenopathy, no thyromegaly.  CHEST:  Quiet throughout, diminished at bases.  O2 is at 2 liters per minute, O2 sat 98%.  Nonproductive cough.  CARDIAC:  S1, S2 distant without S3, S4 or murmurs.  ABDOMEN:  Obese, soft, nontender.  Bowel sounds positive.  EXTREMITIES:  Right upper extremity AV fistula.  No edema.    IMPRESSION:    1.  Altered mental status, ? metabolic encephalopathy.  2.  Severe obstructive sleep apnea (AHI 41) currently untreated.  3.  Asthmatic bronchitis.      The patient was readmitted with altered mental status.  Currently her pCO2 does not appear to be elevated based on venous blood gas.  CT scan of head is unremarkable.  No acute changes.  She is a bit somnolent currently.  TSH performed 1 month ago was within normal limits.  Denies any recent narcotic analgesic or Benzodiazepine  use.    PLAN:    1.  We will reinitiate auto CPAP range of 10-16 cm of water pressure when asleep and if napping daytime.    2.  The patient is to be set up on home auto CPAP through [facility] 04/26/2023.  3.  Reinitiate LABA/LAMA/ICS (Trelegy).  4.  No symptoms of infection currently.    Thank you for allowing me to participate in the care of your most challenging patient.  I will follow along closely.      Dictated By: CHARLIE Henry  Signing Provider: MD ATILIO Rios/tan ([id number])   DD: 04/19/2023 8:48:51 AM TD: 04/19/2023 9:18:51 AM       PAST MEDICAL HISTORY:  UTI (urinary tract infection)

## 2023-09-07 NOTE — ED ADULT TRIAGE NOTE - TEMPERATURE IN CELSIUS (DEGREES C)
36.6 Libtayo Counseling- I discussed with the patient the risks of Libtayo including but not limited to nausea, vomiting, diarrhea, and bone or muscle pain.  The patient verbalized understanding of the proper use and possible adverse effects of Libtayo.  All of the patient's questions and concerns were addressed.

## 2023-09-22 ENCOUNTER — EMERGENCY (EMERGENCY)
Facility: HOSPITAL | Age: 48
LOS: 1 days | Discharge: HOME | End: 2023-09-22
Attending: EMERGENCY MEDICINE
Payer: COMMERCIAL

## 2023-09-22 VITALS
OXYGEN SATURATION: 98 % | TEMPERATURE: 98 F | DIASTOLIC BLOOD PRESSURE: 71 MMHG | SYSTOLIC BLOOD PRESSURE: 109 MMHG | HEART RATE: 69 BPM | WEIGHT: 164.69 LBS | RESPIRATION RATE: 20 BRPM

## 2023-09-22 DIAGNOSIS — Z98.890 OTHER SPECIFIED POSTPROCEDURAL STATES: Chronic | ICD-10-CM

## 2023-09-22 DIAGNOSIS — Z98.51 TUBAL LIGATION STATUS: Chronic | ICD-10-CM

## 2023-09-22 LAB
APPEARANCE UR: CLEAR — SIGNIFICANT CHANGE UP
BACTERIA # UR AUTO: ABNORMAL
BILIRUB UR-MCNC: NEGATIVE — SIGNIFICANT CHANGE UP
COLOR SPEC: YELLOW — SIGNIFICANT CHANGE UP
DIFF PNL FLD: ABNORMAL
EPI CELLS # UR: SIGNIFICANT CHANGE UP
GLUCOSE UR QL: NEGATIVE — SIGNIFICANT CHANGE UP
HCG UR QL: NEGATIVE — SIGNIFICANT CHANGE UP
KETONES UR-MCNC: NEGATIVE — SIGNIFICANT CHANGE UP
LEUKOCYTE ESTERASE UR-ACNC: ABNORMAL
NITRITE UR-MCNC: POSITIVE
PH UR: 7 — SIGNIFICANT CHANGE UP (ref 5–8)
PROT UR-MCNC: 15
RBC CASTS # UR COMP ASSIST: ABNORMAL /HPF (ref 0–4)
SP GR SPEC: 1 — LOW (ref 1.01–1.02)
UROBILINOGEN FLD QL: NEGATIVE — SIGNIFICANT CHANGE UP
WBC UR QL: ABNORMAL /HPF (ref 0–5)

## 2023-09-22 PROCEDURE — 87086 URINE CULTURE/COLONY COUNT: CPT

## 2023-09-22 PROCEDURE — 81001 URINALYSIS AUTO W/SCOPE: CPT

## 2023-09-22 PROCEDURE — 99283 EMERGENCY DEPT VISIT LOW MDM: CPT

## 2023-09-22 PROCEDURE — 81025 URINE PREGNANCY TEST: CPT

## 2023-09-22 PROCEDURE — T1013: CPT

## 2023-09-22 PROCEDURE — 99284 EMERGENCY DEPT VISIT MOD MDM: CPT | Mod: 25

## 2023-09-22 RX ORDER — PHENAZOPYRIDINE HCL 100 MG
2 TABLET ORAL
Qty: 6 | Refills: 0
Start: 2023-09-22 | End: 2023-09-24

## 2023-09-22 RX ORDER — CIPROFLOXACIN LACTATE 400MG/40ML
1 VIAL (ML) INTRAVENOUS
Qty: 10 | Refills: 0
Start: 2023-09-22 | End: 2023-09-26

## 2023-09-22 NOTE — ED PROVIDER NOTE - PATIENT PORTAL LINK FT
You can access the FollowMyHealth Patient Portal offered by [facility] by registering at the following website: [email or web address]. By joining [employer]’s FollowMyHealth portal, you will also be able to view your health information using other applications (apps) compatible with our system.

## 2023-09-22 NOTE — ED PROVIDER NOTE - CLINICAL SUMMARY MEDICAL DECISION MAKING FREE TEXT BOX
AVSS, PE unremarkable; abx prescribed; PMD or clinic follow up recommended for reassessment. Patient is aware of signs/symptoms to return to the emergency department.

## 2023-09-22 NOTE — ED PROVIDER NOTE - TIMING





- Pregnancy-related Complications


Pregnancy-











  10/31/21 10/31/

pediatrician in 3 to 5 days for reevaluation.  Return to the ED immediately if 

your symptoms get worse.


Prescriptions: 


Loratadine 5 ml PO DAILY #150 ml


prednisoLONE SOD PHOSPHAT [Orapred] 7 ml PO DAILY #50 ml


Referrals: 


[facility] [Provider Group] - 3-5 Days


Time of Disposition: 02:41


Print Language: ENGLISH
gradual onset

## 2023-09-23 LAB
CULTURE RESULTS: SIGNIFICANT CHANGE UP
SPECIMEN SOURCE: SIGNIFICANT CHANGE UP

## 2023-10-07 ENCOUNTER — EMERGENCY (EMERGENCY)
Facility: HOSPITAL | Age: 48
LOS: 1 days | Discharge: HOME | End: 2023-10-07
Attending: EMERGENCY MEDICINE
Payer: COMMERCIAL

## 2023-10-07 VITALS
OXYGEN SATURATION: 98 % | WEIGHT: 164.02 LBS | DIASTOLIC BLOOD PRESSURE: 75 MMHG | RESPIRATION RATE: 18 BRPM | SYSTOLIC BLOOD PRESSURE: 110 MMHG | HEART RATE: 68 BPM | TEMPERATURE: 98 F

## 2023-10-07 DIAGNOSIS — Z98.890 OTHER SPECIFIED POSTPROCEDURAL STATES: Chronic | ICD-10-CM

## 2023-10-07 DIAGNOSIS — Z98.51 TUBAL LIGATION STATUS: Chronic | ICD-10-CM

## 2023-10-07 LAB
APPEARANCE UR: CLEAR — SIGNIFICANT CHANGE UP
BILIRUB UR-MCNC: NEGATIVE — SIGNIFICANT CHANGE UP
COLOR SPEC: YELLOW — SIGNIFICANT CHANGE UP
DIFF PNL FLD: ABNORMAL
EPI CELLS # UR: SIGNIFICANT CHANGE UP
GLUCOSE UR QL: NEGATIVE MG/DL — SIGNIFICANT CHANGE UP
HCG UR QL: NEGATIVE — SIGNIFICANT CHANGE UP
KETONES UR-MCNC: NEGATIVE — SIGNIFICANT CHANGE UP
LEUKOCYTE ESTERASE UR-ACNC: ABNORMAL
NITRITE UR-MCNC: NEGATIVE — SIGNIFICANT CHANGE UP
PH UR: 8 — SIGNIFICANT CHANGE UP (ref 5–8)
PROT UR-MCNC: 30 MG/DL
RBC CASTS # UR COMP ASSIST: ABNORMAL /HPF (ref 0–4)
SP GR SPEC: 1.01 — SIGNIFICANT CHANGE UP (ref 1.01–1.02)
UROBILINOGEN FLD QL: NEGATIVE MG/DL — SIGNIFICANT CHANGE UP
WBC UR QL: ABNORMAL /HPF (ref 0–5)

## 2023-10-07 PROCEDURE — 99284 EMERGENCY DEPT VISIT MOD MDM: CPT

## 2023-10-07 RX ORDER — PHENAZOPYRIDINE HCL 100 MG
100 TABLET ORAL ONCE
Refills: 0 | Status: COMPLETED | OUTPATIENT
Start: 2023-10-07 | End: 2023-10-07

## 2023-10-07 RX ORDER — ACETAMINOPHEN 500 MG
650 TABLET ORAL ONCE
Refills: 0 | Status: COMPLETED | OUTPATIENT
Start: 2023-10-07 | End: 2023-10-07

## 2023-10-07 RX ADMIN — Medication 650 MILLIGRAM(S): at 22:21

## 2023-10-07 RX ADMIN — Medication 100 MILLIGRAM(S): at 22:21

## 2023-10-07 NOTE — ED PROVIDER NOTE - OBJECTIVE STATEMENT
48 yo female with no pmhx presents with lower abd pain and dysuria x1 wk. Pt reports she was seen here a few wks ago for the same symptoms, was sent home on abx, her symptoms resolved and now they came back. States she finished her abx on 9/26/23. Pt also reporting urinary frequency for the last week. Denies fever, chills, body aches, dizziness, LOC, vision changes, cp, palpitations, sob, abd pain, n/v/c/d, hematuria, back pain, paresthesias in the extremities, rashes.

## 2023-10-07 NOTE — ED PROVIDER NOTE - CLINICAL SUMMARY MEDICAL DECISION MAKING FREE TEXT BOX
no CVAT. no evidence of clinical pyelo. afebrile. vss. no CVAT. no evidence of clinical pyelo. UA with 26-50 wbc's and mod leuk esterase. will cover with abx. afebrile. vss. strict return precautions explained.

## 2023-10-07 NOTE — ED PROVIDER NOTE - PATIENT PORTAL LINK FT
You can access the FollowMyHealth Patient Portal offered by [facility] by registering at the following website: [email or web address]. By joining [facility]’s FollowMyHealth portal, you will also be able to view your health information using other applications (apps) compatible with our system.

## 2023-10-07 NOTE — ED PROVIDER NOTE - NSFOLLOWUPINSTRUCTIONS_ED_ALL_ED_FT
- Please follow-up with your primary care doctor in the next 1-2 days.  Please call tomorrow for an appointment.  If you cannot follow-up with your primary care doctor please return to the ED for any urgent issues.  - You were given a copy of the tests performed today.  Please bring the results with you and review them with your primary care doctor.  - If you have any worsening of symptoms or any other concerns please return to the ED immediately.  - Please continue taking your home medications as directed.   - Take antibiotics as prescribed.     Urinary Tract Infection    A urinary tract infection (UTI) is an infection of any part of the urinary tract, which includes the kidneys, ureters, bladder, and urethra. Risk factors include ignoring your need to urinate, wiping back to front if female, being an uncircumcised male, and having diabetes or a weak immune system. Symptoms include frequent urination, pain or burning with urination, foul smelling urine, cloudy urine, pain in the lower abdomen, blood in the urine, and fever. If you were prescribed an antibiotic medicine, take it as told by your health care provider. Do not stop taking the antibiotic even if you start to feel better.    SEEK IMMEDIATE MEDICAL CARE IF YOU HAVE ANY OF THE FOLLOWING SYMPTOMS: severe back or abdominal pain, fever, inability to keep fluids or medicine down, dizziness/lightheadedness, or a change in mental status.    - Willem un seguimiento con ortiz médico de atención primaria en los próximos 1 o 2 patrice. Por favor llame mañana para teddy esperanza. Si no puede realizar un seguimiento con ortiz médico de atención primaria, regrese al servicio de urgencias si tiene algún problema urgente.  - Le entregaron teddy copia de las pruebas realizadas hoy. Traiga los resultados con usted y revíselos con ortiz médico de atención primaria.  - Si karie síntomas empeoran o tiene alguna otra inquietud, regrese al servicio de urgencias de inmediato.  - Continúe tomando karie medicamentos caseros según las indicaciones.  - Conrad antibióticos según lo prescrito.    Infección del tracto urinario    Teddy infección del tracto urinario (ITU) es teddy infección de cualquier parte del tracto urinario, que incluye los riñones, los uréteres, la vejiga y la uretra. Los factores de riesgo incluyen ignorar la necesidad de orinar, limpiarse de atrás hacia adelante si es sejal, ser un hombre no circuncidado y tener diabetes o un sistema inmunológico débil. Los síntomas incluyen micción frecuente, dolor o ardor al orinar, orina con mal olor, orina turbia, dolor en la parte inferior del abdomen, chris en la orina y fiebre. Si le recetaron un antibiótico, tómelo según las indicaciones de ortiz proveedor de atención médica. No deje de conrad el antibiótico incluso si comienza a sentirse mejor.    BUSQUE ATENCIÓN MÉDICA INMEDIATA SI TIENE ALGUNO DE LOS SIGUIENTES SÍNTOMAS: dolor intenso de espalda o abdominal, fiebre, incapacidad para retener líquidos o medicamentos, mareos/aturdimiento o un cambio en el estado mental.

## 2023-10-07 NOTE — ED PROVIDER NOTE - PHYSICAL EXAMINATION
Gen: No acute distress, non toxic  HEENT: Mucous membranes moist, pink conjunctivae, EOMI  CV: RRR, nl s1/s2.  Resp: CTAB, normal rate and effort  GI: Abdomen soft, nondistended. +ttp in the suprapubic area. No rebound, no guarding. no ecchymosis   : No CVAT b/l  Neuro: A&O x 3, moving all 4 extremities  MSK: No spine or joint tenderness to palpation  Skin: No rashes. intact and perfused.

## 2023-10-07 NOTE — ED ADULT TRIAGE NOTE - CHIEF COMPLAINT QUOTE
c/o of burning and hurts when urinating. Frequent urination with a little bit of urine comes out at a time. Took two doses of cipro that she had from a previous UTI

## 2023-10-07 NOTE — ED PROVIDER NOTE - ATTENDING APP SHARED VISIT CONTRIBUTION OF CARE
46 yo female with no pmhx presents with lower abd pain and dysuria x1 wk. Pt reports she was seen here a few wks ago for the same symptoms, was sent home on abx, her symptoms resolved and now they came back. States she finished her abx on 9/26/23. Pt also reporting urinary frequency for the last week. Denies f/c/n/v/cp/sob/palpitations/ cough/rash/headache/dizziness/d/c/hematuria    Head: atraumatic, normacephalic  Face: atraumatic, no crepitus no orbiral/maxillary/mandibular ttp  throat: uvula midline no exudates  eyes: perrla eomi  heart: rrr s1s2  lungs: ctab  abd: soft, mild suprapubic discomfort nd +bs no rebound/guarding no cva ttp neg   skin: warm  LE: no swelling, no calf ttp  back: no midline cervical/thoracic/lumbar ttp    +suprapubic discomfort otherwise no abd pain will check ua to eval for uti reassess

## 2023-10-07 NOTE — ED ADULT NURSE NOTE - NSFALLUNIVINTERV_ED_ALL_ED
Bed/Stretcher in lowest position, wheels locked, appropriate side rails in place/Call bell, personal items and telephone in reach/Instruct patient to call for assistance before getting out of bed/chair/stretcher/Non-slip footwear applied when patient is off stretcher/[place] to call system/Physically safe environment - no spills, clutter or unnecessary equipment/Purposeful proactive rounding/Room/bathroom lighting operational, light cord in reach

## 2023-10-07 NOTE — ED ADULT NURSE NOTE - OBJECTIVE STATEMENT
Pt is 47 year old female c/o difficulty and burning when urinating. Pt was here recently with same issue and sent home with abx. Pt states pain did not get any better and has trouble urinating at all. Pt states her outpatient md saw blood in urine and told her to come to ER. Pt took abx regiment and tylenol before coming in .

## 2023-10-08 PROCEDURE — 99283 EMERGENCY DEPT VISIT LOW MDM: CPT

## 2023-10-08 PROCEDURE — 87086 URINE CULTURE/COLONY COUNT: CPT

## 2023-10-08 PROCEDURE — 81025 URINE PREGNANCY TEST: CPT

## 2023-10-08 PROCEDURE — T1013: CPT

## 2023-10-08 PROCEDURE — 81001 URINALYSIS AUTO W/SCOPE: CPT

## 2023-10-08 RX ORDER — CEPHALEXIN 500 MG
500 CAPSULE ORAL ONCE
Refills: 0 | Status: COMPLETED | OUTPATIENT
Start: 2023-10-08 | End: 2023-10-08

## 2023-10-08 RX ORDER — CEPHALEXIN 500 MG
1 CAPSULE ORAL
Qty: 28 | Refills: 0
Start: 2023-10-08 | End: 2023-10-14

## 2023-10-08 RX ADMIN — Medication 500 MILLIGRAM(S): at 00:20

## 2023-10-09 LAB
CULTURE RESULTS: SIGNIFICANT CHANGE UP
SPECIMEN SOURCE: SIGNIFICANT CHANGE UP

## 2023-10-23 NOTE — ED ADULT TRIAGE NOTE - CHIEF COMPLAINT QUOTE
patient c/o b/l feet pain for a month, has burning on urination, and frequent urination for a week. [facility]

## 2023-11-23 NOTE — ED ADULT NURSE NOTE - READING LANGUAGE PREFERRED
Discharge Summary/Instructions after an Endoscopic Procedure  Patient Name: Donell Gomez  Patient MRN: [id number]  Patient YOB: 1959 Thursday, April 6, 2023  Dimitri Smith MD  Dear patient,  As a result of recent federal legislation (The Federal Cures Act), you may   receive lab or pathology results from your procedure in your [facility]   account before your physician is able to contact you. Your physician or   their representative will relay the results to you with their   recommendations at their soonest availability.  Thank you,  RESTRICTIONS:  During your procedure today, you received medications for sedation.  These   medications may affect your judgment, balance and coordination.  Therefore,   for 24 hours, you have the following restrictions:   - DO NOT drive a car, operate machinery, make legal/financial decisions,   sign important papers or drink alcohol.    ACTIVITY:  Today: no heavy lifting, straining or running due to procedural   sedation/anesthesia.  The following day: return to full activity including work.  DIET:  Eat and drink normally unless instructed otherwise.     TREATMENT FOR COMMON SIDE EFFECTS:  - Mild abdominal pain, nausea, belching, bloating or excessive gas:  rest,   eat lightly and use a heating pad.  - Sore Throat: treat with throat lozenges and/or gargle with warm salt   water.  - Because air was used during the procedure, expelling large amounts of air   from your rectum or belching is normal.  - If a bowel prep was taken, you may not have a bowel movement for 1-3 days.    This is normal.  SYMPTOMS TO WATCH FOR AND REPORT TO YOUR PHYSICIAN:  1. Abdominal pain or bloating, other than gas cramps.  2. Chest pain.  3. Back pain.  4. Signs of infection such as: chills or fever occurring within 24 hours   after the procedure.  5. Rectal bleeding, which would show as bright red, maroon, or black stools.   (A tablespoon of blood from the rectum is not serious, especially  if   hemorrhoids are present.)  6. Vomiting.  7. Weakness or dizziness.  GO DIRECTLY TO THE NEAREST EMERGENCY ROOM IF YOU HAVE ANY OF THE FOLLOWING:      Difficulty breathing              Chills and/or fever over 101 F   Persistent vomiting and/or vomiting blood   Severe abdominal pain   Severe chest pain   Black, tarry stools   Bleeding- more than one tablespoon   Any other symptom or condition that you feel may need urgent attention  Your doctor recommends these additional instructions:  If any biopsies were taken, your doctors clinic will contact you in 1 to 2   weeks with any results.  - Discharge patient to home (ambulatory).   - High fiber diet.   - Await pathology results.   - Repeat colonoscopy in 7-10 years for surveillance based on pathology   results.   - Patient has a contact number available for emergencies.  The signs and   symptoms of potential delayed complications were discussed with the   patient.  Return to normal activities tomorrow.  Written discharge   instructions were provided to the patient.  For questions, problems or results please call your physician - Dimitri Smith MD at Work:  [phone].  [facility] [place], EMERGENCY ROOM PHONE NUMBER: [phone]  IF A COMPLICATION OR EMERGENCY SITUATION ARISES AND YOU ARE UNABLE TO REACH   YOUR PHYSICIAN - GO DIRECTLY TO THE EMERGENCY ROOM.  Dimitri Smith MD  4/6/2023 1:57:28 PM  This report has been verified and signed electronically.  Dear patient,  As a result of recent federal legislation (The Federal Cures Act), you may   receive lab or pathology results from your procedure in your [facility]   account before your physician is able to contact you. Your physician or   their representative will relay the results to you with their   recommendations at their soonest availability.  Thank you,  [employer]   - - - [place]

## 2023-11-27 NOTE — ED PROVIDER NOTE - CPE EDP ENMT NORM
Contacted patients daughter in regards to lab results. Informed patients daughter that patient stool for blood as negative but patient is extremely anemic. Patient daughter states patient has had the same problems in the past and he has also followed with hematology and oncology. Also informed patients daughter that patient will need to follow with , GI and , Hematology. Patient daughter voiced understanding and had no further questions.       ----- Message from Sandra Bansal LPN sent at 11/13/2023  1:04 PM CST -----    ----- Message -----  From: Sierra Dickerson MD  Sent: 11/7/2023   8:24 AM CST  To: Sandra Bansal LPN    Please call patient to let him know his stool for blood is currently negative/normal. This is good. However, he has been very anemic. I know this is a chronic, intermittent issue for him. He needs to follow up with GI as soon as possible. If he develops symptoms of anemia in the interim he should go to the nearest ER. He did advised that he is going to start spending a lot of his time in [place]. However, if possible, he needs to follow up with GI in [place]. Please call Dr. Mahajan's office to see how soon they can work him in. It appears he has also seen Hematology (Dr. Bradley) for iron deficiency anemia in the past. When you call Dr. Mahajan's office if they prefer him to see Hematology first since his stool is negative for blood please set this appt up with Dr. Bradley. If patient is not coming back to this area and is moving to [place] he needs to establish care with someone there to assist in managing this issue. Thanks!    
normal...

## 2023-12-01 NOTE — ED ADULT TRIAGE NOTE - MEANS OF ARRIVAL
"Subjective   Esthela Joseph is a 15 y.o. female who presents for Well Child (Pt with mom for 15 yr [facility]).  HPI      Concerns:   Doing the oral medicine    Discussion about exam and chaperone options-  declined chaperone and parent left room for rest of visit  Sleep: well rested and waking up well in the morning   Diet: offering a variety of food groups  [place]:  soft and regular  Dental:  brushing twice a day and seeing dentist  School:   rachelle year- seems to be doing okay As and Bs, doing the [employer]- for [employer], [employer]  Activities:   Menstruation: no problems  Drugs/Alcohol/Tobacco/Vaping: discussed and denies  Sexuality/Puberty: discussed  Safety: discussed   Depression screen done and denies, is struggling with depression, doesn't like breasts but doesn't gender specific concerns, returning to counseling soon -     ROS: negative for general,  Eyes, ENT, cardiovascular, GI. , Ortho, Derm, Psych, Lymph unless noted above    Objective   /68   Pulse 82   Ht 1.537 m (5' 0.5\")   Wt 49.9 kg Comment: 110 lbs  BMI 21.13 kg/m²   Percentiles: 8 %ile (Z= -1.42) based on [facility] (Girls, 2-20 Years) Stature-for-age data based on Stature recorded on 12/1/2023.  25 %ile (Z= -0.66) based on CDC (Girls, 2-20 Years) weight-for-age data using vitals from 12/1/2023.        Physical Exam  General: Well-developed, well-nourished, alert and oriented, no acute distress  Eyes: Normal sclera, ADRIENNE, EOMI. Red reflex intact, light reflex symmetric.   ENT: Moist mucous membranes, normal throat, no nasal discharge. TMs are normal.  Cardiac:  Normal S1/S2, regular rhythm. Capillary refill less than 2 seconds. No clinically significant murmurs.    Pulmonary: Clear to auscultation bilaterally, no work of breathing.  GI: Soft nontender nondistended abdomen, no HSM, no masses.    Skin: No specific or unusual rashes  Neuro: Symmetric face, no ataxia, grossly normal strength and normal " reflexes.  Lymph and Neck: No lymphadenopathy, no visible thyroid swelling.  Musculoskeletal:   Full  range of motion, normal strength and tone, no significant scoliosis,  no joint swelling or bone tenderness  Psych:  normal mood and affect  :  normal female  Juancarlos:     No visits with results within 10 Day(s) from this visit.   Latest known visit with results is:   No results found for any previous visit.       Assessment/Plan   Diagnoses and all orders for this visit:  Encounter for routine child health examination without abnormal findings  Pediatric body mass index (BMI) of 5th percentile to less than 85th percentile for age  Other orders  -     Meningococcal ACWY vaccine, 2-vial component (MENVEO)      Patient Instructions   You received your Meningococcal ACWY #2 vaccine today.  Great job starting counseling again!  Continue with dermatology  We talked about working out a little more.  Some Teens are prone to passing out after blood draws or shots.  This can happen up to 10-15 minutes after the procedure.  We recommend continued observation in the exam or waiting room for the 15 minutes after the blood draw or procedure for your child's safety.  If you choose not to stay in the office during that period, your child should not be left alone during that time period.  IF your child was given vaccines, Vaccine Information Sheets (VIS) were offered and counseling on side effects of vaccines was given.  Side effects most often include fever, and/or redness and or swelling at the injection site.  You can use acetaminophen at any age and ibuprofen at age 6 months and up for any side effects or complaints of pain or fussiness.    Much more rarely, call back or go to the ER if your child has uncontrollable crying, wheezing, difficulty breathing, or any other concerns.  Your teen is growing and developing well.  Be sure to have discussions about social media with your teen.  You should also have discussions about drug,  alcohol, and tobacco use as well as relationships and peer issues.  As your child approaches the age of 's permits and licensing, set a good example by wearing your seat belt and not using your phone while driving.   Teen drivers should keep their phones out of reach or in the trunk so they are not tempted to use them while driving  The Depression screen was done today  It is our responsibility to your teenage to provide guidance and healthcare along with confidentiality in regards to their le.  We discussed physical activity and nutritional requirements for the child today.  Return for a physical every year               Jacqueline Renteria MD    ambulatory

## 2024-02-09 NOTE — ASU PATIENT PROFILE, ADULT - TEACHING/LEARNING LEARNING PREFERENCES
Patient: Clementine Kathleen    Procedure: Procedure(s):  Excisional Node Biopsy Left Neck       Diagnosis: Cervical lymphadenopathy [R59.0]  Diagnosis Additional Information: No value filed.    Anesthesia Type:   General     Note:    Oropharynx: oropharynx clear of all foreign objects and spontaneously breathing  Level of Consciousness: drowsy  Oxygen Supplementation: nasal cannula  Level of Supplemental Oxygen (L/min / FiO2): 2  Independent Airway: airway patency satisfactory and stable  Dentition: dentition unchanged  Vital Signs Stable: post-procedure vital signs reviewed and stable  Report to RN Given: handoff report given  Patient transferred to: PACU    Handoff Report: Identifed the Patient, Identified the Reponsible Provider, Reviewed the pertinent medical history, Discussed the surgical course, Reviewed Intra-OP anesthesia mangement and issues during anesthesia, Set expectations for post-procedure period and Allowed opportunity for questions and acknowledgement of understanding      Vitals:  Vitals Value Taken Time   /68 02/09/24 1135   Temp     Pulse 57 02/09/24 1142   Resp 11 02/09/24 1142   SpO2 99 % 02/09/24 1142   Vitals shown include unfiled device data.    Electronically Signed By: CALOS Dill CRNA  February 9, 2024  11:42 AM  
written material/individual instruction/verbal instruction

## 2024-03-15 ENCOUNTER — EMERGENCY (EMERGENCY)
Facility: HOSPITAL | Age: 49
LOS: 1 days | Discharge: HOME | End: 2024-03-15
Attending: EMERGENCY MEDICINE
Payer: MEDICAID

## 2024-03-15 VITALS
RESPIRATION RATE: 18 BRPM | TEMPERATURE: 99 F | HEART RATE: 69 BPM | OXYGEN SATURATION: 98 % | SYSTOLIC BLOOD PRESSURE: 104 MMHG | DIASTOLIC BLOOD PRESSURE: 67 MMHG | WEIGHT: 163.14 LBS

## 2024-03-15 DIAGNOSIS — Z98.890 OTHER SPECIFIED POSTPROCEDURAL STATES: Chronic | ICD-10-CM

## 2024-03-15 DIAGNOSIS — Z98.51 TUBAL LIGATION STATUS: Chronic | ICD-10-CM

## 2024-03-15 LAB
APPEARANCE UR: CLEAR — SIGNIFICANT CHANGE UP
BILIRUB UR-MCNC: NEGATIVE — SIGNIFICANT CHANGE UP
COLOR SPEC: YELLOW — SIGNIFICANT CHANGE UP
DIFF PNL FLD: NEGATIVE — SIGNIFICANT CHANGE UP
GLUCOSE UR QL: NEGATIVE MG/DL — SIGNIFICANT CHANGE UP
HCG UR QL: NEGATIVE — SIGNIFICANT CHANGE UP
KETONES UR-MCNC: NEGATIVE MG/DL — SIGNIFICANT CHANGE UP
LEUKOCYTE ESTERASE UR-ACNC: NEGATIVE — SIGNIFICANT CHANGE UP
NITRITE UR-MCNC: NEGATIVE — SIGNIFICANT CHANGE UP
PH UR: 8 — SIGNIFICANT CHANGE UP (ref 5–8)
PROT UR-MCNC: NEGATIVE MG/DL — SIGNIFICANT CHANGE UP
SP GR SPEC: 1.02 — SIGNIFICANT CHANGE UP (ref 1–1.03)
UROBILINOGEN FLD QL: 1 MG/DL — SIGNIFICANT CHANGE UP (ref 0.2–1)

## 2024-03-15 PROCEDURE — 99284 EMERGENCY DEPT VISIT MOD MDM: CPT

## 2024-03-15 PROCEDURE — 36415 COLL VENOUS BLD VENIPUNCTURE: CPT

## 2024-03-15 PROCEDURE — 81025 URINE PREGNANCY TEST: CPT

## 2024-03-15 PROCEDURE — 87491 CHLMYD TRACH DNA AMP PROBE: CPT

## 2024-03-15 PROCEDURE — T1013: CPT

## 2024-03-15 PROCEDURE — 87661 TRICHOMONAS VAGINALIS AMPLIF: CPT

## 2024-03-15 PROCEDURE — 87591 N.GONORRHOEAE DNA AMP PROB: CPT

## 2024-03-15 PROCEDURE — 81003 URINALYSIS AUTO W/O SCOPE: CPT

## 2024-03-15 PROCEDURE — 86780 TREPONEMA PALLIDUM: CPT

## 2024-03-15 PROCEDURE — 87086 URINE CULTURE/COLONY COUNT: CPT

## 2024-03-15 RX ORDER — DIPHENHYDRAMINE HCL 50 MG
25 CAPSULE ORAL ONCE
Refills: 0 | Status: COMPLETED | OUTPATIENT
Start: 2024-03-15 | End: 2024-03-15

## 2024-03-15 RX ORDER — FLUCONAZOLE 150 MG/1
150 TABLET ORAL ONCE
Refills: 0 | Status: COMPLETED | OUTPATIENT
Start: 2024-03-15 | End: 2024-03-15

## 2024-03-15 RX ORDER — DIPHENHYDRAMINE HCL 50 MG
1 CAPSULE ORAL
Qty: 15 | Refills: 0
Start: 2024-03-15

## 2024-03-15 RX ORDER — CLOTRIMAZOLE AND BETAMETHASONE DIPROPIONATE 10; .5 MG/G; MG/G
1 CREAM TOPICAL
Qty: 1 | Refills: 0
Start: 2024-03-15 | End: 2024-03-21

## 2024-03-15 RX ADMIN — FLUCONAZOLE 150 MILLIGRAM(S): 150 TABLET ORAL at 13:38

## 2024-03-15 RX ADMIN — Medication 25 MILLIGRAM(S): at 13:38

## 2024-03-15 NOTE — ED PROVIDER NOTE - CROS ED SKIN ALL NEG
Notified Dr. Hamlet Dennis of pts troponin increase to 4.789.  Pt has no complaints at this time.  No new orders at this time.  Will continue to monitor.   - - -

## 2024-03-15 NOTE — ED PROVIDER NOTE - PHYSICAL EXAMINATION
Const: AOX3 nontoxic appearing, no apparent respiratory or physical distress. Stable gait   HEENT: NC/AT. Moist mucous membranes.  Eyes: DG. EOMI  Neck: Soft and supple. Full ROM without pain.  Cardiac: Regular rate and regular rhythm. +S1/S2.  Resp: Speaking in full sentences. No evidence of respiratory distress.  Abd: Soft, non-tender, non-distended. Normal bowel sounds in all 4 quadrants. No guarding or rebound.  gu exam done ar poole  on bimanual examination she reports no tenderness    no obvious vaginal discharge noted  . no perianal area no rash or lesion noted   right breast under breast  patchy erythematic rash noted with excoriation likely dermatitis  Neuro: Awake, alert & oriented x 3. Moves all extremities symmetrically.

## 2024-03-15 NOTE — ED PROVIDER NOTE - OBJECTIVE STATEMENT
48 years old female stated no past medical history presented emergency room complaining of 2 weeks of vaginal discharge that she is described as a white discharge get worse when she laughs and cough. denies any fever or chills or urinary symptoms or pelvic pain she noticed a rash under her breast and perineal area as well as well as around rectom that is itches a lot .  did not seek medical attention .  denies any pregnancy LMP was 10 years ago. denies any history of STD previously denies any  changing new partners.   Gonzalez Menezes

## 2024-03-15 NOTE — ED PROVIDER NOTE - CLINICAL SUMMARY MEDICAL DECISION MAKING FREE TEXT BOX
48 years old female stated no past medical history presented emergency room complaining of 2 weeks of vaginal discharge that she is described as a white discharge get worse when she laughs and cough. denies any fever or chills or urinary symptoms or pelvic pain she noticed a rash under her breast and perineal area as well as well as around rectom that is itches a lot .  did not seek medical attention .  denies any pregnancy LMP was 10 years ago. denies any history of STD previously denies any  changing new partners.   recheck pending urine pregnancy ,  UA/ U culture is chlamydia/ trichomoniasis will treat the 1 dose Diflucan   patient explained that she needs to follow-up in [facility] clinic

## 2024-03-15 NOTE — ED PROVIDER NOTE - ATTENDING APP SHARED VISIT CONTRIBUTION OF CARE
I, Ori Crowder, performed the initial face to face bedside interview with this patient regarding history of present illness, review of symptoms and relevant past medical, social and family history.  I completed an independent physical examination.  I was the initial provider who evaluated this patient. I have signed out the follow up of any pending tests (i.e. labs, radiological studies) to the ACP.  I have communicated the patient’s plan of care and disposition with the ACP.

## 2024-03-15 NOTE — ED PROVIDER NOTE - PATIENT PORTAL LINK FT
You can access the FollowMyHealth Patient Portal offered by [facility] by registering at the following website: [email or web address]. By joining [employer]’s FollowMyHealth portal, you will also be able to view your health information using other applications (apps) compatible with our system.

## 2024-03-15 NOTE — ED PROVIDER NOTE - NSFOLLOWUPINSTRUCTIONS_ED_ALL_ED_FT
edilson los medicamentos según lo recetado. Llame y fanta seguimiento en la clínica [facility]. y dermatología    Infección micótica vaginal en los adultos  Vaginal Yeast Infection, Adult       La infección micótica vaginal es teddy afección que causa secreción vaginal y también dolor, hinchazón y enrojecimiento (inflamación) de la vagina. Esta es teddy afección frecuente. Algunas mujeres contraen esta infección con frecuencia.    ¿Cuáles son las causas?  La causa de la infección es un cambio en el equilibrio normal de los hongos (cándida) y las bacterias que viven en la vagina. Esta alteración deriva en el crecimiento excesivo de los hongos, lo que causa la inflamación.    ¿Qué incrementa el riesgo?  La afección es más probable en las mujeres que tienen estas características:    Epi antibióticos.  Tienen diabetes.  Epi anticonceptivos orales.  Están embarazadas.  Se hacen duchas vaginales con frecuencia.  Tienen debilitado el sistema de defensa del organismo (sistema inmunitario).  Cooper estado tomando medicamentos con corticoesteroides enma mucho tiempo.  Usan ropa ajustada con frecuencia.    ¿Cuáles son los signos o síntomas?  Los síntomas de esta afección incluyen:    Secreción vaginal oleg, cremosa y espesa.  Hinchazón, picazón, enrojecimiento e irritación de la vagina. Los labios de la vagina (vulva) también se pueden infectar.  Dolor o ardor al orinar.  Dolor enma las relaciones sexuales.    ¿Cómo se diagnostica?  Esta afección se diagnostica en función de lo siguiente:    Malathi antecedentes médicos.  Un examen físico.  Un examen pélvico. El médico examinará teddy muestra de la secreción vaginal con un microscopio. Probablemente el médico envíe esta muestra al laboratorio para analizarla y confirmar el diagnóstico.    ¿Cómo se trata?  Esta afección se trata con medicamentos. Los medicamentos pueden ser recetados o de venta mini. Podrán indicarle que use abbie o más de lo siguiente:    Medicamentos que se epi por boca (orales).  Medicamentos que se aplican rosalind teddy crema (tópicos).  Medicamentos que se colocan directamente en la vagina (óvulos vaginales).    Siga estas instrucciones en ortiz casa:         Estilo de angelita    No tenga relaciones sexuales hasta que el médico lo autorice. Comunique a ortiz compañero sexual que tiene teddy infección por hongos. Girma persona debería visitar a ortiz médico y preguntarle si debería recibir tratamiento también.  No use ropa ajustada, rosalind pantimedias o pantalones ajustados.  Use ropa interior de algodón, que permite el paso del aire.        Instrucciones generales    Eagar o aplíquese los medicamentos de venta mini y los recetados solamente rosalind se lo haya indicado el médico.  Consuma más yogur. Rankin puede ayudar a evitar la recurrencia de la infección micótica.  No use tampones hasta que el médico la autorice.  Intente darse un baño de asiento para aliviar las molestias. Se trata de un baño de agua tibia que se lino mientras se está sentada. El agua solo debe llegar hasta las caderas y cubrir las nalgas. Hágalo 3 o 4 veces al día o rosalind se lo haya indicado el médico.  No se fanta duchas vaginales.  Si tiene diabetes, mantenga bajo control los niveles de azúcar en la chris.  Concurra a todas las visitas de seguimiento rosalind se lo haya indicado el médico. Rankin es importante.    Comuníquese con un médico si:  Tiene fiebre.  Los síntomas desaparecen y luego vuelven a aparecer.  Los síntomas no mejoran con el tratamiento.  Malathi síntomas empeoran.  Aparecen nuevos síntomas.  Aparecen ampollas alrededor o adentro de la vagina.  Le sale chris de la vagina y no está menstruando.  Siente dolor en el abdomen.    Resumen  La infección micótica vaginal es tdedy afección que causa secreción y también dolor, hinchazón y enrojecimiento (inflamación) de la vagina.  Esta afección se trata con medicamentos. Los medicamentos pueden ser recetados o de venta mini.  Eagar o aplíquese los medicamentos de venta mini y los recetados solamente rosalind se lo haya indicado el médico.  No se fanta duchas vaginales. No tenga relaciones sexuales ni use tampones hasta que el médico la autorice.  Comuníquese con un médico si los síntomas no mejoran con el tratamiento o si los síntomas desaparecen y luego regresan.    NOTAS ADICIONALES E INSTRUCCIONES    Please follow up with your Primary MD in 24-48 hr.  Seek immediate medical care for any new/worsening signs or symptoms.

## 2024-03-15 NOTE — ED PROVIDER NOTE - CARE PROVIDER_API CALL
Liliane Ruano  Dermatology  [address]  [place] [address]  Phone: [phone]  Fax: [phone]  Follow Up Time: 7-10 Days

## 2024-03-15 NOTE — ED ADULT NURSE NOTE - OBJECTIVE STATEMENT
vaginal / rectal itching , white vaginal discharge x 2 weeks.  Urine sent, pt medicated as ordered.  Discharged to home with follow up instructions.

## 2024-03-16 LAB
CULTURE RESULTS: SIGNIFICANT CHANGE UP
SPECIMEN SOURCE: SIGNIFICANT CHANGE UP
T PALLIDUM AB TITR SER: NEGATIVE — SIGNIFICANT CHANGE UP

## 2024-03-18 LAB
C TRACH RRNA SPEC QL NAA+PROBE: SIGNIFICANT CHANGE UP
N GONORRHOEA RRNA SPEC QL NAA+PROBE: SIGNIFICANT CHANGE UP
SPECIMEN SOURCE: SIGNIFICANT CHANGE UP
T VAGINALIS RRNA SPEC QL NAA+PROBE: SIGNIFICANT CHANGE UP

## 2024-03-25 NOTE — ED ADULT NURSE NOTE - PATIENT/CAREGIVER OFFERED  INTERPRETER SERVICES
Hospital Medicine History & Physical Note    Date of Service  3/25/2024    Primary Care Physician  Pcp Unknown    Consultants  Also new growth dealing with her neck pain    Code Status  Full Code    Chief Complaint  Chief Complaint   Patient presents with    Back Pain     Pt reports almost falling night before last and caught herself, now has pain from neck down her back and L leg        History of Presenting Illness  Lorene Haro is a 49 y.o. female who presented 3/25/2024 with past med history of COPD, schizophrenia ,afib, morbifd obesity, failure who has been to the hospital multiple times for various complaints , comes emergency room today stating that she had a near ground-level fall yesterday and since then, she feels aches in her neck and her lower back.  Patient was given multimodal pain management in the emergency department and was still having complaints of pain and weakness.  Patient did not exhibit much interest in mobility.    The patient states she has Afib and takes Eliquis daily.    No fever or chills    No localizing findings on physical exam so no imaging of the spine have been completed at this time    I discussed the plan of care with patient.    Review of Systems  Review of Systems   Constitutional:  Negative for chills, fever and weight loss.   HENT:  Negative for ear discharge and hearing loss.    Eyes:  Negative for blurred vision, double vision and photophobia.   Respiratory:  Negative for cough, hemoptysis and shortness of breath.    Cardiovascular:  Negative for chest pain, orthopnea and claudication.   Gastrointestinal:  Negative for abdominal pain, heartburn, nausea and vomiting.   Musculoskeletal:  Positive for back pain, myalgias and neck pain.   Neurological:  Negative for dizziness, tingling, sensory change, speech change and headaches.   Psychiatric/Behavioral:  Negative for depression and suicidal ideas.        Past Medical History   has a past medical history of A-fib (HCC),  MONI (acute kidney injury) (HCC) (8/9/2023), Asthma, Bipolar 2 disorder (HCC), Chickenpox, Chronic obstructive pulmonary disease (HCC), Hypertension, On home oxygen therapy, Other psychological stress, Schizophrenic disorder (HCC), and Yeast infection of the skin (04/01/2021).    Surgical History   has a past surgical history that includes hysterectomy laparoscopy; colectomy; cholecystectomy; and knee arthroscopy (Left).     Family History  family history includes Cancer in her sister; No Known Problems in her mother.   Family history reviewed with patient. There is no family history that is pertinent to the chief complaint.     Social History   reports that she has been smoking cigarettes. She has never used smokeless tobacco. She reports that she does not currently use alcohol. She reports that she does not use drugs.    Allergies  Allergies   Allergen Reactions    Penicillin G Rash and Itching     pt reports that she gets a rash all over her body and gets itchy    Amoxicillin Rash and Itching     Tolerates cephalosporins, pt reports that she gets a rash all over her body and gets itchy       Medications  Prior to Admission Medications   Prescriptions Last Dose Informant Patient Reported? Taking?   ACETAMINOPHEN PO 3/24/2024 at PM Patient Yes Yes   Sig: Take 2 Tablets by mouth 1 time a day as needed for Mild Pain.   ADVAIR DISKUS 100-50 MCG/ACT AEROSOL POWDER, BREATH ACTIVATED 3/24/2024 at AM Patient Yes Yes   Sig: Inhale 1 Puff 2 times a day.   albuterol 108 (90 Base) MCG/ACT Aero Soln inhalation aerosol 3/23/2024 at PRN Patient No No   Sig: Inhale 2 Puffs every 6 hours as needed for Shortness of Breath.   apixaban (ELIQUIS) 5mg Tab 3/24/2024 at AM Patient Yes Yes   Sig: Take 5 mg by mouth 2 times a day.   aripiprazole (ABILIFY) 30 MG tablet 3/24/2024 at AM Patient No Yes   Sig: Take 1 Tablet by mouth every morning. Indications: Major Depressive Disorder   atorvastatin (LIPITOR) 20 MG Tab 3/24/2024 at UNK Patient  No Yes   Sig: Take 1 Tablet by mouth at bedtime.   dicyclomine (BENTYL) 20 MG Tab 3/1/2024 at PRN Patient No No   Sig: Take 1 Tablet by mouth every 6 hours as needed (abd pain, diarrhea).   fluticasone (FLONASE) 50 MCG/ACT nasal spray 3/24/2024 at AM Patient Yes Yes   Sig: Administer 2 Sprays into each nostril every morning.   furosemide (LASIX) 20 MG Tab 3/24/2024 at AM Patient No Yes   Sig: Take 1 Tablet by mouth every day.   ondansetron (ZOFRAN ODT) 4 MG TABLET DISPERSIBLE LAST WEEK at PRN Patient No No   Sig: Take 1 Tablet by mouth every 6 hours as needed for Nausea/Vomiting.      Facility-Administered Medications: None       Physical Exam  Temp:  [35.7 °C (96.2 °F)-37.1 °C (98.7 °F)] 37.1 °C (98.7 °F)  Pulse:  [64-76] 67  Resp:  [13-20] 20  BP: ()/(48-98) 125/63  SpO2:  [87 %-97 %] 94 %  Blood Pressure: 125/63   Temperature: 37.1 °C (98.7 °F)   Pulse: 67   Respiration: 20   Pulse Oximetry: 94 %       Physical Exam  Constitutional:       General: She is not in acute distress.     Appearance: She is obese. She is ill-appearing. She is not toxic-appearing or diaphoretic.   HENT:      Head: Normocephalic and atraumatic.      Mouth/Throat:      Mouth: Mucous membranes are moist.   Eyes:      General: No scleral icterus.        Left eye: No discharge.      Extraocular Movements: Extraocular movements intact.      Pupils: Pupils are equal, round, and reactive to light.   Cardiovascular:      Rate and Rhythm: Normal rate and regular rhythm.      Heart sounds: No murmur heard.     No gallop.   Pulmonary:      Effort: No respiratory distress.      Breath sounds: No stridor. No rhonchi.   Abdominal:      General: There is no distension.      Palpations: There is no mass.      Tenderness: There is no abdominal tenderness. There is no guarding or rebound.      Hernia: No hernia is present.   Musculoskeletal:         General: No swelling, tenderness, deformity or signs of injury. Normal range of motion.      Cervical  "back: Normal range of motion.   Skin:     Capillary Refill: Capillary refill takes 2 to 3 seconds.      Coloration: Skin is not jaundiced.      Findings: No bruising.   Neurological:      Mental Status: She is oriented to person, place, and time.      Motor: Weakness present.   Psychiatric:         Mood and Affect: Mood normal.         Behavior: Behavior normal.         Laboratory:  Recent Labs     03/25/24  0648   WBC 10.1   RBC 4.52   HEMOGLOBIN 12.1   HEMATOCRIT 39.7   MCV 87.8   MCH 26.8*   MCHC 30.5*   RDW 54.3*   PLATELETCT 240   MPV 11.3     Recent Labs     03/25/24  0648   SODIUM 142   POTASSIUM 4.0   CHLORIDE 106   CO2 26   GLUCOSE 113*   BUN 8   CREATININE 0.89   CALCIUM 9.2     Recent Labs     03/25/24  0648   ALTSGPT 11   ASTSGOT 10*   ALKPHOSPHAT 125*   TBILIRUBIN 0.5   GLUCOSE 113*         No results for input(s): \"NTPROBNP\" in the last 72 hours.      No results for input(s): \"TROPONINT\" in the last 72 hours.    Imaging:  No orders to display       X-Ray:  I have personally reviewed the images and compared with prior images.    Assessment/Plan:  Justification for Admission Status  I anticipate this patient is appropriate for observation status at this time because I suspect patient will require less than 48 hours for further ration and treatment of her neck pain and weakness      * Intractable back pain- (present on admission)  Assessment & Plan  Multimodal pain management-Tylenol, Motrin, Medrol Dosepak, Lidoderm patch,    P.o. oxycodone as last choice    Ambulate as tolerated    Avoid IV narcotics    Chronic respiratory failure with hypoxia (HCC)- (present on admission)  Assessment & Plan  Patient has baseline oxygen at 2 L    Continue same    COPD (chronic obstructive pulmonary disease) (HCC)- (present on admission)  Assessment & Plan  Not in acute exacerbation    Continue oxygen and bronchodilators    Paroxysmal atrial fibrillation (HCC)- (present on admission)  Assessment & Plan  Patient's Eliquis " to continue    Schizophrenia (HCC)- (present on admission)  Assessment & Plan  Continue Abilify    Morbid obesity (HCC)- (present on admission)  Assessment & Plan  Weight loss and exercise recommended    Debility- (present on admission)  Assessment & Plan  PT OT eval        VTE prophylaxis: SCDs/TEDs   yes

## 2024-04-27 ENCOUNTER — EMERGENCY (EMERGENCY)
Facility: HOSPITAL | Age: 49
LOS: 1 days | Discharge: HOME | End: 2024-04-27
Attending: EMERGENCY MEDICINE
Payer: MEDICAID

## 2024-04-27 VITALS
HEIGHT: 60 IN | DIASTOLIC BLOOD PRESSURE: 57 MMHG | HEART RATE: 57 BPM | SYSTOLIC BLOOD PRESSURE: 110 MMHG | RESPIRATION RATE: 18 BRPM | OXYGEN SATURATION: 97 % | TEMPERATURE: 97 F | WEIGHT: 156.53 LBS

## 2024-04-27 DIAGNOSIS — Z98.890 OTHER SPECIFIED POSTPROCEDURAL STATES: Chronic | ICD-10-CM

## 2024-04-27 DIAGNOSIS — Z98.51 TUBAL LIGATION STATUS: Chronic | ICD-10-CM

## 2024-04-27 PROCEDURE — T1013: CPT

## 2024-04-27 PROCEDURE — 99283 EMERGENCY DEPT VISIT LOW MDM: CPT

## 2024-04-27 PROCEDURE — 99284 EMERGENCY DEPT VISIT MOD MDM: CPT

## 2024-04-27 RX ORDER — IBUPROFEN 200 MG
400 TABLET ORAL ONCE
Refills: 0 | Status: COMPLETED | OUTPATIENT
Start: 2024-04-27 | End: 2024-04-27

## 2024-04-27 RX ORDER — IBUPROFEN 200 MG
1 TABLET ORAL
Qty: 42 | Refills: 0
Start: 2024-04-27 | End: 2024-05-03

## 2024-04-27 RX ORDER — CYCLOBENZAPRINE HYDROCHLORIDE 10 MG/1
1 TABLET, FILM COATED ORAL
Qty: 30 | Refills: 0
Start: 2024-04-27 | End: 2024-05-06

## 2024-04-27 RX ADMIN — Medication 400 MILLIGRAM(S): at 11:03

## 2024-04-27 RX ADMIN — Medication 400 MILLIGRAM(S): at 11:18

## 2024-04-27 NOTE — ED PROVIDER NOTE - NSFOLLOWUPINSTRUCTIONS_ED_ALL_ED_FT
You likely have pain in your shoulder from using it too much at work. You can take tylenol for the pain. A muscle relaxer has also been sent to your pharmacy. Pick it up and take it as prescribed. The muscle relaxer can make you sleepy, do not drive or operate heavy machinery when taking it.     You should follow up with your primary care provider and see an occupational therapist for further evaluation and management of your shoulder pain.     If you do not have a primary care doctor call [phone] to find a primary care doctor OR make an appointment with [facility] in [place] at [phone]    -------  Es probable que sienta dolor en el hombro por usarlo demasiado en el trabajo. Puedes edilson tylenol para el dolor. También le barajas enviado a ortiz farmacia un relajante muscular. Recógelo y tómalo según lo prescrito. El relajante muscular puede causarle sueño; no conduzca ni opere maquinaria pesada mientras lo lino.    Debe hacer un seguimiento con ortiz proveedor de atención primaria y consultar a un terapeuta ocupacional para teddy evaluación y control adicionales de ortiz dolor de hombro.    Si no tiene un médico de atención primaria, llame al [phone] para buscar un médico de atención primaria O programe teddy esperanza con [facility] en [place] al [phone]

## 2024-04-27 NOTE — ED PROVIDER NOTE - PATIENT PORTAL LINK FT
You can access the FollowMyHealth Patient Portal offered by [facility] by registering at the following website: [email or web address]. By joining [employer]’s FollowMyHealth portal, you will also be able to view your health information using other applications (apps) compatible with our system.

## 2024-04-27 NOTE — ED PROVIDER NOTE - OBJECTIVE STATEMENT
47 yo female with no past medical history presents to ED with right upper back shoulder pain x 1 week. Patient reports pain to right scapula region with no radiation to lower back. 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 Patient denies neck or chest pain. Patient denies injury to the area, radiation, midline tenderness, numbness, tingling, weakness.

## 2024-04-27 NOTE — ED ADULT TRIAGE NOTE - CHIEF COMPLAINT QUOTE
pt c/o RUQ pain & pain to right breast x 1 week  A&Ox3, resp wnl, pt had a fungal rash to right side on fluconazole

## 2024-04-27 NOTE — ED PROVIDER NOTE - CLINICAL SUMMARY MEDICAL DECISION MAKING FREE TEXT BOX
Nutrition Tips for Home:  • Try to eat at least 6 small meals or snacks a day. You may want to set an alarm to eat every 2-3 hours.  • Remember to include protein foods often such as eggs, nuts, nut butters, beans, meats, poultry and fish, milk, yogurt and cottage cheese, tofu/soy products.  • Include high calorie foods like cheese, avocado, olive or canola oil, dried fruit and nuts, granola, and cream cheese.  • Drink most fluids between meals instead of with meals.  • You may try oral nutrition supplement drinks such as [place] Instant Breakfast, Boost or Ensure, Glucerna (for diabetics) or Nepro (for dialysis), or store brand version of supplements such as [employer].  o Tips for adding oral supplements to your daily routine:  - Drink when taking medications if they can be taken with food  - Serve chilled or pour over ice  - Freeze into a popsicle or blend with ice cream and fruit into a shake  - Pour over cereal instead of milk  - Add to coffee instead of milk or cream  - Enjoy as a snack       Gain Weight Healthfully      You can gain weight by adding more calories to your daily intake.    The following suggestions are ways to boost your calorie intake while keeping overall health in mind.      Food  Tips    Breads  • Hearty, dense breads, pitas or flatbreads, multigrain, bran, rye, pumpernickel and whole wheat   • Fill with turkey, chicken, tuna, lean roast beef, and low fat cheeses   • A peanut butter and jelly sandwich is a great healthy, high calorie choice    Cereals  • Choose dense cereals, such as granola, muesli, oatmeal, and Grape-Nuts   • Cook hot cereals with milk instead of water   • Add mix-ins, such as powdered milk, peanut butter, fresh or dried fruit, and nuts and seeds (such as    ground flaxseed    Fruit  • Add dried or fresh fruit to cereal and salads   • Make fruit smoothies using items such as fruit, milk, yogurt, frozen yogurt and 100% fruit juice    Vegetables  • Top with  olive oil, tub margarine, slivered almonds or grated cheese   • Peas, corn, carrots, winter squash and potatoes will have more calories per serving than other    vegetables   • Add tub margarine and extra powdered milk to mashed potatoes    Meats  • Choose lean cuts of meat more often: chicken, fish, turkey, pork, or red meat (round or loin cuts)   • Boost the calories of lean meats by sautéing them in olive oil or canola oil, add whole wheat bread crumbs or crushed nuts as toppings    Beans/ Legumes  • Calorie-dense and excellent sources of vitamins, carbohydrates and protein   • Lentils, low-fat refried beans, hummus, bean burritos and cooked dried beans    Milk/   Beverages  • Add 1 cup powdered milk to 1 quart 2% milk   • Use added flavorings, such as [place] Breakfast Essentials, Ovaltine, or hot cocoa/cocoa powder • Drink high-calorie beverages in place of water, such as smoothies, milkshakes, sports drinks, protein    drinks and 100% fruit juices    Fats  • Heart-healthy choices: peanut butter, avocado, soft tub margarine, canola oil, olive oil, nuts, and   seeds   • Generously add these items to your favorite dishes    Soups  • Choose thicker soups, such as lentil, split pea, minestrone, chili, barley and stews   • Make canned soups heartier by replacing water with milk, evaporated milk, or add extra powdered    milk    Salads  • Add cottage cheese, hard cooked eggs, low fat cheese, garbanzo beans (chick peas), nuts, seeds, avocado, vegetables, dried fruit, lean meat, fish, croutons and salad dressing    Snacks  • Add snacks between meals and at bedtime to help boost calories   • Yogurt with fresh fruit, low-fat cheese and crackers, nuts and seeds, trail mix, granola, granola bars,    peanut butter, milk shakes, smoothies, baked potato stuffed with vegetables and low-fat cheese,     instant breakfast drinks and fresh or dried fruit    Desserts  • Select desserts that are more nutritious   • Frozen yogurt,  oatmeal raisin cookies, chocolate pudding, stewed fruit compote, fig bars, and    bran muffins     Distributed by [facility]. For additional information or to speak to a dietitian, please call the nearest [place] facility.      GainWeightHealthfully 2017        47 yo female with no past medical history presents to ED with right upper back/shoulder pain x 1 week. Patient reports pain to right scapula region with no radiation to lower back. 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 Patient denies neck or chest pain. Patient denies injury to the area, radiation, midline tenderness, numbness, tingling, weakness. On PE, patient has normal ROM of neck and shoulder, TTP over R scapula region, no overlying skin changes, neuro intact.

## 2024-04-27 NOTE — ED PROVIDER NOTE - ATTENDING CONTRIBUTION TO CARE
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

## 2024-04-27 NOTE — ED PROVIDER NOTE - NS ED ATTENDING STATEMENT MOD
I have seen and examined this patient and fully participated in the care of this patient as the teaching attending.  The service was shared with the GRACIA.  I reviewed and verified the documentation.

## 2024-05-02 DIAGNOSIS — M54.6 PAIN IN THORACIC SPINE: ICD-10-CM

## 2024-05-02 DIAGNOSIS — M25.511 PAIN IN RIGHT SHOULDER: ICD-10-CM

## 2024-07-11 ENCOUNTER — EMERGENCY (EMERGENCY)
Facility: HOSPITAL | Age: 49
LOS: 1 days | Discharge: HOME | End: 2024-07-11
Attending: STUDENT IN AN ORGANIZED HEALTH CARE EDUCATION/TRAINING PROGRAM
Payer: MEDICAID

## 2024-07-11 VITALS
TEMPERATURE: 98 F | SYSTOLIC BLOOD PRESSURE: 106 MMHG | DIASTOLIC BLOOD PRESSURE: 71 MMHG | HEART RATE: 59 BPM | OXYGEN SATURATION: 98 % | RESPIRATION RATE: 18 BRPM

## 2024-07-11 DIAGNOSIS — Z98.51 TUBAL LIGATION STATUS: Chronic | ICD-10-CM

## 2024-07-11 DIAGNOSIS — Z98.890 OTHER SPECIFIED POSTPROCEDURAL STATES: Chronic | ICD-10-CM

## 2024-07-11 LAB
APPEARANCE UR: CLEAR — SIGNIFICANT CHANGE UP
BACTERIA # UR AUTO: ABNORMAL /HPF
BILIRUB UR-MCNC: NEGATIVE — SIGNIFICANT CHANGE UP
COLOR SPEC: YELLOW — SIGNIFICANT CHANGE UP
DIFF PNL FLD: ABNORMAL
GLUCOSE UR QL: NEGATIVE MG/DL — SIGNIFICANT CHANGE UP
HCG UR QL: NEGATIVE — SIGNIFICANT CHANGE UP
KETONES UR-MCNC: NEGATIVE MG/DL — SIGNIFICANT CHANGE UP
LEUKOCYTE ESTERASE UR-ACNC: ABNORMAL
NITRITE UR-MCNC: NEGATIVE — SIGNIFICANT CHANGE UP
PH UR: 6 — SIGNIFICANT CHANGE UP (ref 5–8)
PROT UR-MCNC: NEGATIVE MG/DL — SIGNIFICANT CHANGE UP
RBC CASTS # UR COMP ASSIST: 1 /HPF — SIGNIFICANT CHANGE UP (ref 0–4)
SP GR SPEC: 1.01 — SIGNIFICANT CHANGE UP (ref 1–1.03)
SQUAMOUS # UR AUTO: 1 /HPF — SIGNIFICANT CHANGE UP (ref 0–5)
UROBILINOGEN FLD QL: 1 MG/DL — SIGNIFICANT CHANGE UP (ref 0.2–1)
WBC UR QL: 118 /HPF — HIGH (ref 0–5)

## 2024-07-11 PROCEDURE — T1013: CPT

## 2024-07-11 PROCEDURE — 81025 URINE PREGNANCY TEST: CPT

## 2024-07-11 PROCEDURE — 99283 EMERGENCY DEPT VISIT LOW MDM: CPT

## 2024-07-11 PROCEDURE — 87086 URINE CULTURE/COLONY COUNT: CPT

## 2024-07-11 PROCEDURE — 99284 EMERGENCY DEPT VISIT MOD MDM: CPT

## 2024-07-11 PROCEDURE — 81001 URINALYSIS AUTO W/SCOPE: CPT

## 2024-07-11 RX ORDER — CEPHALEXIN 500 MG
1 CAPSULE ORAL
Qty: 28 | Refills: 0
Start: 2024-07-11 | End: 2024-07-17

## 2024-07-12 LAB
CULTURE RESULTS: SIGNIFICANT CHANGE UP
SPECIMEN SOURCE: SIGNIFICANT CHANGE UP

## 2024-07-17 DIAGNOSIS — N39.0 URINARY TRACT INFECTION, SITE NOT SPECIFIED: ICD-10-CM

## 2024-07-17 DIAGNOSIS — R30.0 DYSURIA: ICD-10-CM

## 2024-09-01 ENCOUNTER — EMERGENCY (EMERGENCY)
Facility: HOSPITAL | Age: 49
LOS: 1 days | Discharge: HOME | End: 2024-09-01
Attending: EMERGENCY MEDICINE
Payer: MEDICAID

## 2024-09-01 VITALS
DIASTOLIC BLOOD PRESSURE: 36 MMHG | WEIGHT: 143.96 LBS | RESPIRATION RATE: 20 BRPM | OXYGEN SATURATION: 97 % | HEART RATE: 68 BPM | SYSTOLIC BLOOD PRESSURE: 100 MMHG | TEMPERATURE: 98 F | HEIGHT: 61 IN

## 2024-09-01 DIAGNOSIS — Z98.51 TUBAL LIGATION STATUS: Chronic | ICD-10-CM

## 2024-09-01 DIAGNOSIS — Z98.890 OTHER SPECIFIED POSTPROCEDURAL STATES: Chronic | ICD-10-CM

## 2024-09-01 PROCEDURE — 99284 EMERGENCY DEPT VISIT MOD MDM: CPT

## 2024-09-01 PROCEDURE — 96372 THER/PROPH/DIAG INJ SC/IM: CPT

## 2024-09-01 PROCEDURE — T1013: CPT

## 2024-09-01 PROCEDURE — 99283 EMERGENCY DEPT VISIT LOW MDM: CPT

## 2024-09-01 RX ORDER — TRIAMCINOLONE ACETONIDE 200 MG/5ML
40 INJECTION, SUSPENSION INTRA-ARTICULAR; INTRAMUSCULAR ONCE
Refills: 0 | Status: COMPLETED | OUTPATIENT
Start: 2024-09-01 | End: 2024-09-01

## 2024-09-01 RX ORDER — CLOTRIMAZOLE 1 G/100G
1 CREAM TOPICAL
Qty: 1 | Refills: 1
Start: 2024-09-01 | End: 2024-09-14

## 2024-09-01 RX ADMIN — TRIAMCINOLONE ACETONIDE 40 MILLIGRAM(S): 200 INJECTION, SUSPENSION INTRA-ARTICULAR; INTRAMUSCULAR at 10:54

## 2024-09-05 DIAGNOSIS — B35.3 TINEA PEDIS: ICD-10-CM

## 2024-09-05 DIAGNOSIS — L25.9 UNSPECIFIED CONTACT DERMATITIS, UNSPECIFIED CAUSE: ICD-10-CM

## 2024-09-05 DIAGNOSIS — R21 RASH AND OTHER NONSPECIFIC SKIN ERUPTION: ICD-10-CM

## 2024-10-11 ENCOUNTER — EMERGENCY (EMERGENCY)
Facility: HOSPITAL | Age: 49
LOS: 1 days | Discharge: HOME | End: 2024-10-11
Attending: EMERGENCY MEDICINE
Payer: MEDICAID

## 2024-10-11 VITALS
RESPIRATION RATE: 20 BRPM | TEMPERATURE: 98 F | WEIGHT: 152.78 LBS | SYSTOLIC BLOOD PRESSURE: 103 MMHG | DIASTOLIC BLOOD PRESSURE: 70 MMHG | HEIGHT: 61 IN | HEART RATE: 64 BPM | OXYGEN SATURATION: 98 %

## 2024-10-11 DIAGNOSIS — Z98.890 OTHER SPECIFIED POSTPROCEDURAL STATES: Chronic | ICD-10-CM

## 2024-10-11 DIAGNOSIS — Z98.51 TUBAL LIGATION STATUS: Chronic | ICD-10-CM

## 2024-10-11 PROCEDURE — 99284 EMERGENCY DEPT VISIT MOD MDM: CPT

## 2024-10-11 PROCEDURE — T1013: CPT

## 2024-10-11 PROCEDURE — 99283 EMERGENCY DEPT VISIT LOW MDM: CPT

## 2024-10-11 RX ORDER — CLOTRIMAZOLE 100 MG
1 TABLET VAGINAL ONCE
Refills: 0 | Status: DISCONTINUED | OUTPATIENT
Start: 2024-10-11 | End: 2024-10-11

## 2024-10-11 RX ORDER — ANTI-ITCH CREAM 1 G/100G
1 OINTMENT TOPICAL
Qty: 1 | Refills: 0
Start: 2024-10-11 | End: 2024-10-20

## 2024-10-11 NOTE — ED PROVIDER NOTE - PROGRESS NOTE DETAILS
On attempted discharge patient notes that she has been using triamcinolone and nystatin cream at night for the past several weeks without improvement.  Given this new information will escalate to clobetasol 0.025% at bedtime.  Called pharmacy to change prescription.

## 2024-10-11 NOTE — ED PROVIDER NOTE - NSFOLLOWUPINSTRUCTIONS_ED_ALL_ED_FT
[facility] hidrocortisona según lo prescrito.  Willem un seguimiento con ortiz médico de atención primaria u obstetra/ginecólogo dentro de las próximas 1 a 2 semanas.  Regrese al departamento de emergencias si los síntomas empeoran.    La vaginitis es la inflamación de la vagina. Suele ser causada por teddy alteración en el equilibrio normal de las bacterias y los hongos que viven en la vagina. Esta alteración provoca el crecimiento excesivo de ciertos hongos o bacterias, lo que causa la inflamación. Hay distintos tipos de vaginitis, juan miguel los más frecuentes son los siguientes:    Vaginosis bacteriana.  Infección por hongos (candidiasis).  Tricomoniasis. Esta es teddy infección de transmisión sexual (ITS).  Vaginitis viral.  Vaginitis atrófica.  Vaginitis alérgica.    ¿Cuáles son las causas?  La causa depende del tipo de vaginitis. Las causas de la vaginitis pueden ser las siguientes:    Bacterias (vaginosis bacteriana).  Hongos (infección por hongos).  Un parásito (tricomoniasis).  Un virus (vaginitis viral).  Niveles bajos de hormonas (vaginitis atrófica). Los niveles bajos de hormonas pueden ocurrir enma el embarazo o la lactancia, o después de la menopausia.  Agentes irritantes, rosalind catherine de espuma, tampones perfumados y aerosoles vaginales (vaginitis alérgica).    Hay otros factores que pueden alterar el equilibrio normal de las bacterias y los hongos que viven en la vagina. Estas incluyen los siguientes:    Conrad antibióticos.  Falta de higiene.  Diafragmas, esponjas vaginales, espermicidas, pastillas anticonceptivas y dispositivos intrauterinos (DIU).  Relaciones sexuales.  Infección.  Diabetes que no se controla.  Tener un sistema inmunitario debilitado.    ¿Cuáles son los signos o los síntomas?  Los síntomas pueden variar según la causa de la vaginitis. Entre los síntomas más frecuentes, se incluyen los siguientes:    Secreción vaginal anormal.    La secreción es oleg, karissa o amarilla en el andreia de vaginosis bacteriana.  La secreción es espesa, oleg y de consistencia similar al queso en andreia de infección por hongos.  La secreción es espumosa y amarilla o verdosa en andreia de tricomoniasis.  Olor vaginal desagradable.    El olor es similar al pescado en andreia de vaginosis bacteriana.  Picazón, dolor o hinchazón vaginal.  Relaciones sexuales dolorosas.  Dolor o ardor al orinar.    En algunos casos, no hay síntomas.    ¿Cómo se trata?  El tratamiento dependerá del tipo de infección.    La vaginosis bacteriana y la tricomoniasis suelen tratarse con cremas o pastillas con antibióticos.  Las infecciones por hongos suelen tratarse con antimicóticos, rosalind cremas u óvulos.  La vaginitis viral no tiene danny, juan miguel los síntomas pueden tratarse con medicamentos que alivian la incomodidad. Ortiz madalyn sexual también debe tratarse.  La vaginitis atrófica puede tratarse con cremas, píldoras u óvulos con estrógeno, o con un anillo vaginal. Si hay sequedad vaginal, los lubricantes y las cremas humectantes pueden ser de utilidad. Es posible que deba evitar el uso de jabones perfumados, aerosoles o duchas vaginales.  El tratamiento de la vaginitis alérgica implica interrumpir el uso del producto que está causando el problema. Se pueden utilizar cremas vaginales para tratar los síntomas.    Siga estas indicaciones en ortiz casa:  [facility] todos los medicamentos según le indicó ortiz médico.  Mantenga la smitha genital limpia y seca. Use solo agua para enjuagar la smitha; no use jabón.  Evite la ducha vaginal. Esta puede eliminar las bacterias beneficiosas de la vagina.  No use tampones ni tenga relaciones sexuales hasta tratarse la vaginitis. Use compresas higiénicas mientras tenga vaginitis.  Higienícese de adelante hacia atrás. [place] el esparcimiento de las bacterias del recto a la vagina.  Airee la smitha genital. Use ropa interior de algodón para reducir la acumulación de humedad.    No utilice ropa interior para dormir hasta que la vaginitis desaparezca.  No use pantalones ni ropa interior ajustados, o medias de nailon sin protección de algodón.  Quítese la ropa húmeda (en especial el traje de baño) lo antes posible.  Utilice productos suaves y sin perfume. No utilice agentes irritantes, por ejemplo:    Aerosoles femeninos perfumados.  Suavizantes para la ropa.  Detergentes perfumados.  Tampones perfumados.  Jabones perfumados o catherine de espuma.  Practique el sexo seguro y use preservativos. Los preservativos previenen el contagio de la tricomoniasis y la vaginitis viral.    Comuníquese con un médico si:  Siente dolor abdominal.  Tiene síntomas que camilo más de 2 o 3 patrice.  Tiene fiebre y los síntomas empeoran repentinamente.    NOTAS ADICIONALES E INSTRUCCIONES    Please follow up with your Primary MD in 24-48 hr.  Seek immediate medical care for any new/worsening signs or symptoms.

## 2024-10-11 NOTE — ED PROVIDER NOTE - CLINICAL SUMMARY MEDICAL DECISION MAKING FREE TEXT BOX
Patient presenting with colitis.  No signs of no discharge or redness.  Will treat with topical hydrocortisone and encouraged OB/GYN follow-up.

## 2024-10-11 NOTE — ED PROVIDER NOTE - OBJECTIVE STATEMENT
Patient is a 48-year-old female with no significant past medical history presenting with vaginitis.  She notes symptoms been present for several months.  Patient was seen here about a month ago for itching and hives which resolved with medication but states that vaginitis has persisted.  On further questioning patient notes there is no itching inside the vagina but rather only on the outside more consistent with vulvitis.  Patient has not discharge, urinary complaints, fevers, chills, nausea, vomiting.  No trouble swallowing or breathing.  Has not tried any medications for symptom control.

## 2024-10-11 NOTE — ED PROVIDER NOTE - PATIENT PORTAL LINK FT
You can access the FollowMyHealth Patient Portal offered by [facility] by registering at the following website: [email or web address]. By joining [employer]’s FollowMyHealth portal, you will also be able to view your health information using other applications (apps) compatible with our system.

## 2024-11-13 NOTE — ED ADULT TRIAGE NOTE - CADM TRG TX PRIOR TO ARRIVAL
34 weeks gestation of pregnancy    Doing well, good fetal movement  Having some left hip pain. No contractions, VB, LOF.  Discussed birth plan: wants epidural  Plans Depo-provera injections PP, has used this method before and was happy  24lb 13oz TWG  Kick counts and PTL precautions  M 11/19, pt aware    Fetal growth restriction antepartum    U/S today: VTX, posterior placenta, MVP 3.3, BPP 8/8, normal dopplers  Continue 2x weekly testing  [facility] visit 11/19  IOL 12/9 at 8AM    Hx of preeclampsia, prior pregnancy, currently pregnant    Normal BP  Daily ASA    Hypothyroid in pregnancy, antepartum, third trimester    Will need repeat labs 3 weeks from 11/4 per MFM    Abnormal serum thyroxine (T4) level    LGSIL on Pap smear of cervix    PAP PP    Vaccine counseling  -     influenza (Flulaval, Fluzone, Fluarix) 45 mcg/0.5 mL IM vaccine (> or = 6 mo) 0.5 mL     Reviewed current vaccine recommendations. Wants FLU today. Will consider RSV for next OV.   none

## 2024-11-16 ENCOUNTER — EMERGENCY (EMERGENCY)
Facility: HOSPITAL | Age: 49
LOS: 1 days | Discharge: HOME | End: 2024-11-16
Attending: EMERGENCY MEDICINE
Payer: MEDICAID

## 2024-11-16 VITALS
HEIGHT: 61 IN | DIASTOLIC BLOOD PRESSURE: 68 MMHG | SYSTOLIC BLOOD PRESSURE: 94 MMHG | TEMPERATURE: 98 F | RESPIRATION RATE: 20 BRPM | HEART RATE: 87 BPM | OXYGEN SATURATION: 97 % | WEIGHT: 155.87 LBS

## 2024-11-16 VITALS
TEMPERATURE: 98 F | RESPIRATION RATE: 18 BRPM | DIASTOLIC BLOOD PRESSURE: 68 MMHG | SYSTOLIC BLOOD PRESSURE: 110 MMHG | HEART RATE: 68 BPM | OXYGEN SATURATION: 98 %

## 2024-11-16 DIAGNOSIS — Z98.890 OTHER SPECIFIED POSTPROCEDURAL STATES: Chronic | ICD-10-CM

## 2024-11-16 DIAGNOSIS — Z98.51 TUBAL LIGATION STATUS: Chronic | ICD-10-CM

## 2024-11-16 LAB
ALBUMIN SERPL ELPH-MCNC: 3.8 G/DL — SIGNIFICANT CHANGE UP (ref 3.3–5.2)
ALP SERPL-CCNC: 132 U/L — HIGH (ref 40–120)
ALT FLD-CCNC: 17 U/L — SIGNIFICANT CHANGE UP
ANION GAP SERPL CALC-SCNC: 9 MMOL/L — SIGNIFICANT CHANGE UP (ref 5–17)
AST SERPL-CCNC: 22 U/L — SIGNIFICANT CHANGE UP
BASOPHILS # BLD AUTO: 0.01 K/UL — SIGNIFICANT CHANGE UP (ref 0–0.2)
BASOPHILS NFR BLD AUTO: 0.2 % — SIGNIFICANT CHANGE UP (ref 0–2)
BILIRUB SERPL-MCNC: 0.4 MG/DL — SIGNIFICANT CHANGE UP (ref 0.4–2)
BUN SERPL-MCNC: 13.9 MG/DL — SIGNIFICANT CHANGE UP (ref 8–20)
CALCIUM SERPL-MCNC: 9.1 MG/DL — SIGNIFICANT CHANGE UP (ref 8.4–10.5)
CHLORIDE SERPL-SCNC: 105 MMOL/L — SIGNIFICANT CHANGE UP (ref 96–108)
CO2 SERPL-SCNC: 25 MMOL/L — SIGNIFICANT CHANGE UP (ref 22–29)
CREAT SERPL-MCNC: 0.4 MG/DL — LOW (ref 0.5–1.3)
EGFR: 121 ML/MIN/1.73M2 — SIGNIFICANT CHANGE UP
EOSINOPHIL # BLD AUTO: 0.14 K/UL — SIGNIFICANT CHANGE UP (ref 0–0.5)
EOSINOPHIL NFR BLD AUTO: 2.5 % — SIGNIFICANT CHANGE UP (ref 0–6)
GLUCOSE SERPL-MCNC: 99 MG/DL — SIGNIFICANT CHANGE UP (ref 70–99)
HCT VFR BLD CALC: 36.6 % — SIGNIFICANT CHANGE UP (ref 34.5–45)
HGB BLD-MCNC: 12.4 G/DL — SIGNIFICANT CHANGE UP (ref 11.5–15.5)
IMM GRANULOCYTES NFR BLD AUTO: 0.4 % — SIGNIFICANT CHANGE UP (ref 0–0.9)
LYMPHOCYTES # BLD AUTO: 1.09 K/UL — SIGNIFICANT CHANGE UP (ref 1–3.3)
LYMPHOCYTES # BLD AUTO: 19.4 % — SIGNIFICANT CHANGE UP (ref 13–44)
MCHC RBC-ENTMCNC: 31.4 PG — SIGNIFICANT CHANGE UP (ref 27–34)
MCHC RBC-ENTMCNC: 33.9 G/DL — SIGNIFICANT CHANGE UP (ref 32–36)
MCV RBC AUTO: 92.7 FL — SIGNIFICANT CHANGE UP (ref 80–100)
MONOCYTES # BLD AUTO: 0.43 K/UL — SIGNIFICANT CHANGE UP (ref 0–0.9)
MONOCYTES NFR BLD AUTO: 7.7 % — SIGNIFICANT CHANGE UP (ref 2–14)
NEUTROPHILS # BLD AUTO: 3.93 K/UL — SIGNIFICANT CHANGE UP (ref 1.8–7.4)
NEUTROPHILS NFR BLD AUTO: 69.8 % — SIGNIFICANT CHANGE UP (ref 43–77)
PLATELET # BLD AUTO: 221 K/UL — SIGNIFICANT CHANGE UP (ref 150–400)
POTASSIUM SERPL-MCNC: 4 MMOL/L — SIGNIFICANT CHANGE UP (ref 3.5–5.3)
POTASSIUM SERPL-SCNC: 4 MMOL/L — SIGNIFICANT CHANGE UP (ref 3.5–5.3)
PROT SERPL-MCNC: 6.6 G/DL — SIGNIFICANT CHANGE UP (ref 6.6–8.7)
RBC # BLD: 3.95 M/UL — SIGNIFICANT CHANGE UP (ref 3.8–5.2)
RBC # FLD: 12.4 % — SIGNIFICANT CHANGE UP (ref 10.3–14.5)
S PYO DNA THROAT QL NAA+PROBE: SIGNIFICANT CHANGE UP
SODIUM SERPL-SCNC: 139 MMOL/L — SIGNIFICANT CHANGE UP (ref 135–145)
WBC # BLD: 5.62 K/UL — SIGNIFICANT CHANGE UP (ref 3.8–10.5)
WBC # FLD AUTO: 5.62 K/UL — SIGNIFICANT CHANGE UP (ref 3.8–10.5)

## 2024-11-16 PROCEDURE — 99284 EMERGENCY DEPT VISIT MOD MDM: CPT

## 2024-11-16 PROCEDURE — 93010 ELECTROCARDIOGRAM REPORT: CPT

## 2024-11-16 PROCEDURE — 96360 HYDRATION IV INFUSION INIT: CPT

## 2024-11-16 PROCEDURE — T1013: CPT

## 2024-11-16 PROCEDURE — 87798 DETECT AGENT NOS DNA AMP: CPT

## 2024-11-16 PROCEDURE — 71046 X-RAY EXAM CHEST 2 VIEWS: CPT

## 2024-11-16 PROCEDURE — 93005 ELECTROCARDIOGRAM TRACING: CPT

## 2024-11-16 PROCEDURE — 85025 COMPLETE CBC W/AUTO DIFF WBC: CPT

## 2024-11-16 PROCEDURE — 36415 COLL VENOUS BLD VENIPUNCTURE: CPT

## 2024-11-16 PROCEDURE — 87651 STREP A DNA AMP PROBE: CPT

## 2024-11-16 PROCEDURE — 80053 COMPREHEN METABOLIC PANEL: CPT

## 2024-11-16 PROCEDURE — 99285 EMERGENCY DEPT VISIT HI MDM: CPT | Mod: 25

## 2024-11-16 PROCEDURE — 71046 X-RAY EXAM CHEST 2 VIEWS: CPT | Mod: 26

## 2024-11-16 RX ORDER — ACETAMINOPHEN 500 MG
650 TABLET ORAL ONCE
Refills: 0 | Status: COMPLETED | OUTPATIENT
Start: 2024-11-16 | End: 2024-11-16

## 2024-11-16 RX ADMIN — Medication 1000 MILLILITER(S): at 11:20

## 2024-11-16 RX ADMIN — Medication 650 MILLIGRAM(S): at 10:13

## 2024-11-16 RX ADMIN — Medication 650 MILLIGRAM(S): at 11:20

## 2024-11-16 RX ADMIN — Medication 1000 MILLILITER(S): at 10:13

## 2024-11-16 NOTE — ED PROVIDER NOTE - PHYSICAL EXAMINATION
General: NAD, well appearing  HEENT: Normocephalic, atraumatic, moist mucous membranes, submandibular lymphadenopathy, no tonsillar exudates   Neck: No apparent stiffness or JVD  Pulm: Chest wall symmetric and nontender, lungs clear to ascultation, no wheezes, rhales, ISABEL  Cardiac: Regular rate and regular rhythm, 2+ radial pulses bilaterally  Abdomen: Soft, nontender, nondistended  Skin: Skin is warm, dry and intact without rashes or lesions.  Neuro: No motor or sensory deficits above reported baseline  MSK: No deformity or tenderness above reported baseline

## 2024-11-16 NOTE — ED PROVIDER NOTE - PATIENT PORTAL LINK FT
You can access the FollowMyHealth Patient Portal offered by [facility] by registering at the following website: [email or web address]. By joining [employer]’s FollowMyHealth portal, you will also be able to view your health information using other applications (apps) compatible with our system.

## 2024-11-16 NOTE — ED ADULT TRIAGE NOTE - ACCOMPANIED BY
Growing and developing well. Encouraged routine dental and eye exams. Safety and anticipatory guidance discussed, including growth, development, nutrition, safety and age appropriate immunizations. Up to date on vaccinations. Age appropriate handout provided. Follow up for annual well child exam, sooner if concerns arise.    
Rapid strep negative. Discussed viral versus allergic etiology and encouraged routine eye exams. Will check labs per grandmother's request. They will monitor closely and notify clinic if headache persists.   
Self

## 2024-11-16 NOTE — ED PROVIDER NOTE - NS ED ATTENDING STATEMENT MOD
Pt. had been straight cath'd prior to Nightshift RN, pt. also has hx of BPH, maintained on flomax & proscar PO PMH: DM, HTN, HLD, AFib. PMH DM Attending with

## 2024-11-16 NOTE — ED PROVIDER NOTE - CLINICAL SUMMARY MEDICAL DECISION MAKING FREE TEXT BOX
49 yo F with no significant PMH presenting with subjective fevers, chills malaise and productive cough, muscle aches since Wednesday. On exam pt HDS, breath sounds clear bilaterally, abdo soft, nontender, submandibular lymphadenopathy bilaterally, no tonsilar exudates noticed. Ordered basic labs, strep throat, cxr, LR bolus. 47 yo F with no significant PMH presenting with subjective fevers, chills malaise and productive cough, muscle aches since Wednesday. On exam pt HDS, breath sounds clear bilaterally, abdo soft, nontender, submandibular lymphadenopathy bilaterally, no tonsilar exudates noticed. Ordered basic labs, strep throat, cxr, LR bolus. Labs WNL. EKG unremarkable. cXR negative. Strep negative/

## 2024-11-16 NOTE — ED ADULT NURSE NOTE - OBJECTIVE STATEMENT
assumed care of pt at 1010. c/o reproducible chest pain, fevers and body aches for the last 4 days. denies taking tylenols or Motrin. denies medical problems. rr even and unlabored. pt educated on plan of care, pt able to successfully teach back plan of care to RN, RN will continue to reeducate pt during hospital stay.

## 2024-11-16 NOTE — ED PROVIDER NOTE - NSFOLLOWUPINSTRUCTIONS_ED_ALL_ED_FT
- Acuda a teddy esperanza de seguimiento con ortiz médico en un plazo de 2 a 3 días.   - Lleve los resultados a la esperanza.  - Regrese al servicio de urgencias si aparecen síntomas nuevos o empeoran.    - [place] de 500 mg a 1000 mg de Tylenol (acetaminofén) con alimentos cada 6 horas enma 3 días para reducir la inflamación y tratar el dolor. Después, puede tomarlo según sea necesario; siga las instrucciones del envase. La dosis estándar de Tylenol es de 500 mg, por lo que debe edilson 1 o 2 pastillas por dosis. No tome más de 4000 mg por día.  - [place] 600 mg de ibuprofeno (Motrin, Advil) con alimentos cada 8 horas enma 3 días para reducir la inflamación y tratar el dolor. Después, puede tomarlo según sea necesario; siga las instrucciones del envase. La dosis estándar de Motrin o Advil es de 200 mg, por lo que debe edilson de 3 a 4 pastillas por dosis. No lo tome si está embarazada o planea quedar embarazada.     BUSQUE ATENCIÓN MÉDICA INMEDIATA SI TIENE ALGUNO DE LOS SIGUIENTES SÍNTOMAS: fiebre, incapacidad para retener suficientes líquidos, vómito nallely o con chris, heces negras o con chris, mareos, dolor en el pecho, dolor de candido intenso, sarpullido, dificultad para respirar, piel fría o húmeda, confusión, dolor al orinar o cualquier signo de deshidratación.      Enfermedades virales en los adultos  Viral Illness, Adult  Los virus son microbios diminutos que entran en el organismo de teddy persona y causan enfermedades. Hay muchos tipos diferentes de virus. Y causan muchos tipos de enfermedades. Las enfermedades virales pueden ser leves o graves. Pueden afectar diferentes partes del cuerpo.    Entre las afecciones a corto plazo causadas por virus, se incluyen los resfríos, la gripe y los virus estomacales. Entre las afecciones a skip plazo causadas por un virus, se incluyen el herpes, la culebrilla y la infección por el virus de inmunodeficiencia humana (VIH). Se barajas identificado unos pocos virus asociados con determinados tipos de cáncer.    ¿Cuáles son las causas?  Muchos tipos de virus pueden causar enfermedades. Los virus ingresan en las células del organismo, se multiplican y provocan que las células infectadas funcionen de manera diferente o mueran. Cuando estas células mueren, liberan más virus. Cuando esto ocurre, aparecen los síntomas de la enfermedad, y el virus se disemina a otras células. Si el virus domina el funcionamiento de la célula, puede hacer que esta se divida y prolifere de manera descontrolada. [place] ocurre cuando un virus causa cáncer.    Los diferentes virus ingresan al organismo de distintas formas. Puede contraer un virus de la siguiente manera:  Al ingerir alimentos o beber agua que barajas entrado en contacto con el virus.  Al inhalar gotitas que teddy persona infectada liberó en el aire al toser o estornudar.  Al tocar teddy superficie que tenga el virus y luego llevarse la mano a la boca, la nariz o los ojos.  Al ser penelope por un insecto o mordido por un animal que son portadores del virus.  Al tener contacto sexual con teddy persona infectada por el virus.  Al tener contacto con chris o líquidos que contienen el virus, ya sea a través de un elsa abierto o enma teddy transfusión.  Si el virus ingresa al organismo, el sistema del cuerpo que combate las enfermedades (sistema inmunitario) intentará combatirlo. Puede correr un riesgo más alto de tener teddy enfermedad viral si tiene el sistema inmunitario debilitado.    ¿Cuáles son los signos o síntomas?  Los síntomas dependen del tipo de virus y de la ubicación de las células en las que ingresa. Entre los síntomas se pueden incluir los siguientes:  Con los virus del resfrío y de la gripe:  Fiebre.  Dolor de candido.  Dolor de garganta.  Martina musculares.  Nariz tapada (congestión nasal).  Tos.  Con los virus estomacales (gastrointestinales):  Fiebre.  Dolor en el abdomen.  Náuseas o vómitos.  Diarrea.  Con los virus hepáticos (hepatitis):  Pérdida del apetito.  Cansancio.  La piel o las partes morgan de los ojos se ponen millicent (ictericia).  Con los virus del cerebro y la médula kelly:  Fiebre.  Dolor de candido.  Rigidez en el christina.  Náuseas y vómitos.  Confusión o somnolencia.  Con los virus de la piel:  Verrugas.  Picazón.  Erupción cutánea.  Con los virus de transmisión sexual:  Secreción.  Hinchazón.  Enrojecimiento.  Erupción cutánea.  ¿Cómo se diagnostica?  Esta afección se puede diagnosticar en función de teddy o más de las siguientes evaluaciones:  Los síntomas y antecedentes médicos.  Un examen físico.  Pruebas, rosalind, por ejemplo:  Análisis de chris.  Análisis de teddy muestra de mucosidad de los pulmones (muestra de esputo).  Análisis de teddy muestra de materia fecal (heces).  Análisis de un hisopado de líquidos corporales o teddy llaga en la piel (lesión).  ¿Cómo se trata?  Los virus pueden ser difíciles de tratar porque se hospedan en las células. Los antibióticos no tratan los virus porque estos medicamentos no llegan al interior de las células. El tratamiento de teddy enfermedad viral puede incluir lo siguiente:  Descansar y beber mucho líquido.  Medicamentos para tratar los síntomas. Estos pueden incluir medicamentos de venta mini para el dolor y la fiebre, medicamentos para la tos o la congestión y medicamentos para la diarrea.  Medicamentos antivirales. Estos medicamentos están disponibles únicamente para ciertos tipos de virus.  Algunas enfermedades virales pueden evitarse con vacunas. Un ejemplo frecuente es la vacuna antigripal.    Siga estas indicaciones en ortiz casa:  Medicamentos    Use los medicamentos de venta mini y los recetados solamente rosalind se lo haya indicado el médico.  Si le recetaron un medicamento antiviral, tómelo rosalind se lo haya indicado el médico. No deje de edilson el antiviral aunque comience a sentirse mejor.  Sepa cuándo los antibióticos son necesarios y cuándo no. Los antibióticos no combaten a los virus. Si tiene teddy infección viral y el médico hanna que también tiene teddy infección bacteriana, o que está en riesgo de contraerla, jessica vez le recete un antibiótico.  No solicite teddy receta para antibióticos si le barajas diagnosticado teddy enfermedad viral. Los antibióticos no harán que se cure más rápidamente.  Edilson antibióticos cuando no son necesarios puede derivar en resistencia a los antibióticos. Cuando esto ocurre, el medicamento pierde ortiz eficacia contra las bacterias que normalmente combate.  Indicaciones generales    [place] suficiente líquido para mantener el pis (la orina) de color amarillo pálido.  Descanse todo lo que pueda.  Retome malathi actividades normales rosalind se lo haya indicado el médico. Pregúntele al médico qué actividades son seguras para usted.  ¿Cómo se previene?  A person washing hands with soap and water.  Para reducir el riesgo de contraer otra enfermedad viral:  Lávese las lorenzo frecuentemente con agua y jabón enma al menos 20 segundos. Use desinfectante para lorenzo si no dispone de agua y jabón.  Evite tocarse la nariz, los ojos y la boca, sobre todo si no se lavó las lorenzo recientemente.  Si un miembro de la tariq tiene teddy infección viral, limpie todas las superficies de la casa que puedan romina estado en contacto con el virus. Use [place] y jabón. También puede usar teddy solución de preparación comercial que contenga lejía.  Manténgase lejos de las personas enfermas con síntomas de teddy infección viral.  No comparta objetos tales rosalind cepillos de dientes y botellas de agua con otras personas.  Mantenga las vacunas al día. [place] incluye recibir la vacuna antigripal todos los años.  Siga teddy dieta saludable y descanse lo suficiente.  Comuníquese con un médico si:  Tiene síntomas de teddy enfermedad viral que no desaparecen.  Los síntomas regresan después de romina desaparecido.  Malathi síntomas empeoran.  Solicite ayuda de inmediato si:  Tiene dificultad para respirar.  Siente un dolor de candido intenso o rigidez en el christina.  Tiene vómitos o dolor abdominal intensos.

## 2024-11-16 NOTE — ED PROVIDER NOTE - ATTENDING CONTRIBUTION TO CARE
47 yo F with no significant PMH presenting with subjective fevers, chills malaise and productive cough, muscle aches since Wednesday. On exam pt HDS, breath sounds clear bilaterally, abdo soft, nontender, submandibular lymphadenopathy bilaterally, no tonsilar exudates noticed. Ordered basic labs, strep throat, cxr, LR bolus. Will re-evaluate after lab and imaging    Impression: Viral syndrome    I, Ori Crowder, performed the initial face to face bedside interview with this patient regarding history of present illness, review of symptoms and relevant past medical, social and family history.  I completed an independent physical examination.  I was the initial provider who evaluated this patient. I have signed out the follow up of any pending tests (i.e. labs, radiological studies) to the resident.  I have communicated the patient’s plan of care and disposition with the resident

## 2024-11-16 NOTE — ED PROVIDER NOTE - OBJECTIVE STATEMENT
49 yo F with no significant PMH presenting with subjective fevers, chills malaise and productive cough. Patient reports symptoms started Wednesday including chills, fevers, productive cough with yellow phlegm, sore throat, rhinorrhea, muscle aches and generalized weakness. Pt endorses pain on inspiration. She denies abdominal pain n/v, diarrhea. Denies sick contacts. Pt denies visual changes, tinnitus, ear pain; denies abd pain, n/v/d; denies cp/palp; denies sob; denies dysuria, hematuria, frequency, urgency; denies headache; denies numbness/tingling; denies changes in neurologic status/function; denies rashes.

## 2025-01-24 ENCOUNTER — EMERGENCY (EMERGENCY)
Facility: HOSPITAL | Age: 50
LOS: 1 days | Discharge: HOME | End: 2025-01-24
Attending: STUDENT IN AN ORGANIZED HEALTH CARE EDUCATION/TRAINING PROGRAM
Payer: MEDICAID

## 2025-01-24 VITALS
HEART RATE: 77 BPM | HEIGHT: 60 IN | SYSTOLIC BLOOD PRESSURE: 97 MMHG | WEIGHT: 155.87 LBS | TEMPERATURE: 98 F | DIASTOLIC BLOOD PRESSURE: 68 MMHG | RESPIRATION RATE: 18 BRPM | OXYGEN SATURATION: 97 %

## 2025-01-24 DIAGNOSIS — Z98.51 TUBAL LIGATION STATUS: Chronic | ICD-10-CM

## 2025-01-24 DIAGNOSIS — Z98.890 OTHER SPECIFIED POSTPROCEDURAL STATES: Chronic | ICD-10-CM

## 2025-01-24 LAB
APPEARANCE UR: ABNORMAL
BACTERIA # UR AUTO: ABNORMAL /HPF
BILIRUB UR-MCNC: NEGATIVE — SIGNIFICANT CHANGE UP
CAST: 1 /LPF — SIGNIFICANT CHANGE UP (ref 0–4)
COLOR SPEC: YELLOW — SIGNIFICANT CHANGE UP
DIFF PNL FLD: ABNORMAL
GLUCOSE UR QL: NEGATIVE MG/DL — SIGNIFICANT CHANGE UP
KETONES UR-MCNC: NEGATIVE MG/DL — SIGNIFICANT CHANGE UP
LEUKOCYTE ESTERASE UR-ACNC: ABNORMAL
NITRITE UR-MCNC: NEGATIVE — SIGNIFICANT CHANGE UP
PH UR: 6.5 — SIGNIFICANT CHANGE UP (ref 5–8)
PROT UR-MCNC: SIGNIFICANT CHANGE UP MG/DL
RBC CASTS # UR COMP ASSIST: 0 /HPF — SIGNIFICANT CHANGE UP (ref 0–4)
SP GR SPEC: 1.01 — SIGNIFICANT CHANGE UP (ref 1–1.03)
SQUAMOUS # UR AUTO: 14 /HPF — HIGH (ref 0–5)
UROBILINOGEN FLD QL: 0.2 MG/DL — SIGNIFICANT CHANGE UP (ref 0.2–1)
WBC UR QL: 49 /HPF — HIGH (ref 0–5)

## 2025-01-24 PROCEDURE — 99283 EMERGENCY DEPT VISIT LOW MDM: CPT

## 2025-01-24 PROCEDURE — 99284 EMERGENCY DEPT VISIT MOD MDM: CPT

## 2025-01-24 PROCEDURE — T1013: CPT

## 2025-01-24 PROCEDURE — 81001 URINALYSIS AUTO W/SCOPE: CPT

## 2025-01-24 PROCEDURE — 87086 URINE CULTURE/COLONY COUNT: CPT

## 2025-01-24 RX ORDER — NYSTATIN TOPICAL POWDER 100000 U/G
1 POWDER TOPICAL
Qty: 1 | Refills: 0
Start: 2025-01-24 | End: 2025-02-02

## 2025-01-24 NOTE — ED PROVIDER NOTE - NSFOLLOWUPINSTRUCTIONS_ED_ALL_ED_FT
Please follow-up your doctor in 48 hours    Urinary Tract Infection    A urinary tract infection (UTI) is an infection of any part of the urinary tract, which includes the kidneys, ureters, bladder, and urethra. Risk factors include ignoring your need to urinate, wiping back to front if female, being an uncircumcised male, and having diabetes or a weak immune system. Symptoms include frequent urination, pain or burning with urination, foul smelling urine, cloudy urine, pain in the lower abdomen, blood in the urine, and fever. If you were prescribed an antibiotic medicine, take it as told by your health care provider. Do not stop taking the antibiotic even if you start to feel better.    SEEK IMMEDIATE MEDICAL CARE IF YOU HAVE ANY OF THE FOLLOWING SYMPTOMS: severe back or abdominal pain, fever, inability to keep fluids or medicine down, dizziness/lightheadedness, or a change in mental status.     Rash    A rash is a change in the color of the skin. A rash can also change the way your skin feels. There are many different conditions and factors that can cause a rash, most of which are not dangerous. Make sure to follow up with your primary care physician or a dermatologist as instructed by your health care provider.    SEEK IMMEDIATE MEDICAL CARE IF YOU HAVE ANY OF THE FOLLOWING SYMPTOMS: fever, blisters, a rash inside your mouth, vaginal or anal pain, or altered mental status.

## 2025-01-24 NOTE — ED PROVIDER NOTE - CPE EDP ENMT NORM
1319 Received in Phase 2 . Drowsy responsive to verbal stimuli. Airway patent. O2 sat   Injection site clean and dry. Rates back pain # 7 on arrival.  1324 Drink and snack given. 1330 ice pack to back  1337 Assisted to sit at side of cart to get dressed. 5 Discharged home via private car without complaint. normal...

## 2025-01-24 NOTE — ED PROVIDER NOTE - PATIENT PORTAL LINK FT
You can access the FollowMyHealth Patient Portal offered by [facility] by registering at the following website: [email or web address]. By joining [facility]’s FollowMyHealth portal, you will also be able to view your health information using other applications (apps) compatible with our system.

## 2025-01-24 NOTE — ED PROVIDER NOTE - OBJECTIVE STATEMENT
49-year-old female no known past medical history presents emergency department complaining of dysuria over the past 2 days.  Patient also reports a itchy rash to the right lateral chest wall.  Denies any headache, dizziness, neck pain, chest pain, shortness of breath, palpitations, fever, chills, nausea, vomiting, abdominal pain, or flank pain.

## 2025-01-24 NOTE — ED ADULT TRIAGE NOTE - CHIEF COMPLAINT QUOTE
Addended by: TESSIE HOUSE on: 11/11/2019 08:58 AM     Modules accepted: Orders     pt states she has burning on urination x 3 weeks, c/o itchiness all over  A&Ox3 resp wnl

## 2025-01-24 NOTE — ED PROVIDER NOTE - CLINICAL SUMMARY MEDICAL DECISION MAKING FREE TEXT BOX
49-year-old female presents emergency department with dysuria and a rash.  UA obtained shows moderate leuk and 49 white blood cells.  Will treat with antibiotics and fungal cream.  Follow-up as outpatient.  ED return precautions discussed

## 2025-01-24 NOTE — ED PROVIDER NOTE - ATTENDING APP SHARED VISIT CONTRIBUTION OF CARE
49-year-old female with no PMH presents for dysuria for the past 2 days.  Patient also reports itchy rash to the right lateral chest wall.  Patient with UTI as well as fungal like rash will give antibiotics and a topical antifungal instructed to follow-up with primary care doctor as well as dermatologist given strict return precautions

## 2025-01-25 LAB
CULTURE RESULTS: SIGNIFICANT CHANGE UP
SPECIMEN SOURCE: SIGNIFICANT CHANGE UP

## 2025-06-19 NOTE — ED ADULT NURSE NOTE - PRIMARY CARE PROVIDER
Requesting    Name from pharmacy: ALENDRONATE 35MG TABLETS         Will file in chart as: ALENDRONATE 35 MG Oral Tab    Sig: TAKE 1 TABLET(35 MG) BY MOUTH EVERY 7 DAYS    Disp: 12 tablet    Refills: 3 (Pharmacy requested: Not specified)    Start: 6/19/2025    Class: Normal    Non-formulary For: Osteopenia, unspecified location    Last ordered: 11 months ago (7/8/2024) by Shelby Garduno MD    Last refill: 3/23/2025    Rx #: [id number]    Osteoporosis Medication Protocol [id number]/2025 08:27 AM   Protocol Details In person appointment or virtual visit in the past 6 mos or appointment in next 3 mos    DEXA scan within past 2 years    CMP within the past 12 months    Calcium level between 8.3 and 10.3    GFR level greater than 35    Medication is active on med list      To be filled at: [id number] - [place], [place] - [address] AT [facility] [address], [phone], [phone]     LOV: 04/30/25 w/ DVF  RTC: 08/07/25   Last Relevant Labs: 02/07/25     Future Appointments   Date Time Provider Department Center   7/1/2025  1:45 PM Kd Burroughs DPM [id number]   8/5/2025 11:00 AM Shelby Yarbrough MD [facility]      
inga